# Patient Record
Sex: MALE | Race: BLACK OR AFRICAN AMERICAN | NOT HISPANIC OR LATINO | URBAN - METROPOLITAN AREA
[De-identification: names, ages, dates, MRNs, and addresses within clinical notes are randomized per-mention and may not be internally consistent; named-entity substitution may affect disease eponyms.]

---

## 2017-05-01 ENCOUNTER — OUTPATIENT (OUTPATIENT)
Dept: OUTPATIENT SERVICES | Facility: HOSPITAL | Age: 77
LOS: 1 days | End: 2017-05-01
Payer: MEDICARE

## 2017-05-01 VITALS
WEIGHT: 184.97 LBS | TEMPERATURE: 98 F | RESPIRATION RATE: 16 BRPM | DIASTOLIC BLOOD PRESSURE: 56 MMHG | SYSTOLIC BLOOD PRESSURE: 116 MMHG | HEART RATE: 73 BPM | HEIGHT: 70 IN

## 2017-05-01 DIAGNOSIS — N40.1 BENIGN PROSTATIC HYPERPLASIA WITH LOWER URINARY TRACT SYMPTOMS: ICD-10-CM

## 2017-05-01 DIAGNOSIS — Z98.41 CATARACT EXTRACTION STATUS, RIGHT EYE: Chronic | ICD-10-CM

## 2017-05-01 DIAGNOSIS — N40.0 BENIGN PROSTATIC HYPERPLASIA WITHOUT LOWER URINARY TRACT SYMPTOMS: ICD-10-CM

## 2017-05-01 DIAGNOSIS — Z01.818 ENCOUNTER FOR OTHER PREPROCEDURAL EXAMINATION: ICD-10-CM

## 2017-05-01 DIAGNOSIS — R31.29 OTHER MICROSCOPIC HEMATURIA: ICD-10-CM

## 2017-05-01 PROCEDURE — 80048 BASIC METABOLIC PNL TOTAL CA: CPT

## 2017-05-01 PROCEDURE — 87086 URINE CULTURE/COLONY COUNT: CPT

## 2017-05-01 PROCEDURE — G0463: CPT

## 2017-05-01 PROCEDURE — 85027 COMPLETE CBC AUTOMATED: CPT

## 2017-05-01 PROCEDURE — 81001 URINALYSIS AUTO W/SCOPE: CPT

## 2017-05-01 PROCEDURE — 93005 ELECTROCARDIOGRAM TRACING: CPT

## 2017-05-01 PROCEDURE — 93010 ELECTROCARDIOGRAM REPORT: CPT | Mod: NC

## 2017-05-01 NOTE — H&P PST ADULT - ASSESSMENT
77 y/o male with BPH with c/o hematuria, scheduled for cystoscopy and urethroscopy. pre op testing today. medical eval advised.

## 2017-05-01 NOTE — H&P PST ADULT - FAMILY HISTORY
Mother  Still living? No  FH: stroke, Age at diagnosis: Age Unknown     Father  Still living? No  FH: colon cancer, Age at diagnosis: Age Unknown

## 2017-05-01 NOTE — H&P PST ADULT - HISTORY OF PRESENT ILLNESS
75 y/o male 75 y/o male, PMH of HTN, Gout, hyperlipidemia, went for regular check up, had blood in the urine when tested. Also with h/o enlarged prostate, gets up 2-3 times at night to urinate.  Was referred to Dr Casanova, scheduled fro cystoscopy and urethroscopy. preop testing today.

## 2017-05-01 NOTE — H&P PST ADULT - NSANTHOSAYNRD_GEN_A_CORE
No. ENRRIQUE screening performed.  STOP BANG Legend: 0-2 = LOW Risk; 3-4 = INTERMEDIATE Risk; 5-8 = HIGH Risk

## 2017-05-02 LAB
CULTURE RESULTS: NO GROWTH — SIGNIFICANT CHANGE UP
SPECIMEN SOURCE: SIGNIFICANT CHANGE UP

## 2017-05-10 ENCOUNTER — TRANSCRIPTION ENCOUNTER (OUTPATIENT)
Age: 77
End: 2017-05-10

## 2017-05-10 RX ORDER — SODIUM CHLORIDE 9 MG/ML
1000 INJECTION, SOLUTION INTRAVENOUS
Qty: 0 | Refills: 0 | Status: DISCONTINUED | OUTPATIENT
Start: 2017-05-11 | End: 2017-05-11

## 2017-05-11 ENCOUNTER — OUTPATIENT (OUTPATIENT)
Dept: OUTPATIENT SERVICES | Facility: HOSPITAL | Age: 77
LOS: 1 days | Discharge: HOME | End: 2017-05-11
Payer: MEDICARE

## 2017-05-11 VITALS
DIASTOLIC BLOOD PRESSURE: 70 MMHG | RESPIRATION RATE: 12 BRPM | TEMPERATURE: 98 F | OXYGEN SATURATION: 97 % | HEART RATE: 56 BPM | SYSTOLIC BLOOD PRESSURE: 130 MMHG

## 2017-05-11 VITALS
SYSTOLIC BLOOD PRESSURE: 137 MMHG | HEIGHT: 70 IN | RESPIRATION RATE: 16 BRPM | TEMPERATURE: 98 F | WEIGHT: 186.95 LBS | OXYGEN SATURATION: 96 % | HEART RATE: 59 BPM | DIASTOLIC BLOOD PRESSURE: 68 MMHG

## 2017-05-11 DIAGNOSIS — Z98.41 CATARACT EXTRACTION STATUS, RIGHT EYE: Chronic | ICD-10-CM

## 2017-05-11 DIAGNOSIS — R31.29 OTHER MICROSCOPIC HEMATURIA: ICD-10-CM

## 2017-05-11 DIAGNOSIS — N40.1 BENIGN PROSTATIC HYPERPLASIA WITH LOWER URINARY TRACT SYMPTOMS: ICD-10-CM

## 2017-05-11 PROCEDURE — 87086 URINE CULTURE/COLONY COUNT: CPT

## 2017-05-11 PROCEDURE — 52000 CYSTOURETHROSCOPY: CPT

## 2017-05-11 RX ORDER — AMLODIPINE BESYLATE 2.5 MG/1
5 TABLET ORAL DAILY
Qty: 0 | Refills: 0 | Status: DISCONTINUED | OUTPATIENT
Start: 2017-05-11 | End: 2017-05-26

## 2017-05-11 RX ORDER — ALLOPURINOL 300 MG
300 TABLET ORAL DAILY
Qty: 0 | Refills: 0 | Status: DISCONTINUED | OUTPATIENT
Start: 2017-05-11 | End: 2017-05-26

## 2017-05-11 RX ORDER — CEFTRIAXONE 500 MG/1
1 INJECTION, POWDER, FOR SOLUTION INTRAMUSCULAR; INTRAVENOUS ONCE
Qty: 0 | Refills: 0 | Status: COMPLETED | OUTPATIENT
Start: 2017-05-11 | End: 2017-05-11

## 2017-05-11 RX ORDER — ATORVASTATIN CALCIUM 80 MG/1
1 TABLET, FILM COATED ORAL
Qty: 0 | Refills: 0 | COMMUNITY

## 2017-05-11 RX ORDER — CEFTRIAXONE 500 MG/1
1 INJECTION, POWDER, FOR SOLUTION INTRAMUSCULAR; INTRAVENOUS EVERY 24 HOURS
Qty: 0 | Refills: 0 | Status: CANCELLED | OUTPATIENT
Start: 2017-05-12 | End: 2017-05-11

## 2017-05-11 RX ORDER — ALLOPURINOL 300 MG
1 TABLET ORAL
Qty: 0 | Refills: 0 | COMMUNITY

## 2017-05-11 RX ORDER — FINASTERIDE 5 MG/1
1 TABLET, FILM COATED ORAL
Qty: 0 | Refills: 0 | COMMUNITY

## 2017-05-11 RX ORDER — AMLODIPINE BESYLATE 2.5 MG/1
1 TABLET ORAL
Qty: 0 | Refills: 0 | COMMUNITY

## 2017-05-11 RX ORDER — ATORVASTATIN CALCIUM 80 MG/1
10 TABLET, FILM COATED ORAL DAILY
Qty: 0 | Refills: 0 | Status: DISCONTINUED | OUTPATIENT
Start: 2017-05-11 | End: 2017-05-11

## 2017-05-11 RX ORDER — MORPHINE SULFATE 50 MG/1
2 CAPSULE, EXTENDED RELEASE ORAL
Qty: 0 | Refills: 0 | Status: DISCONTINUED | OUTPATIENT
Start: 2017-05-11 | End: 2017-05-11

## 2017-05-11 RX ORDER — CEFTRIAXONE 500 MG/1
INJECTION, POWDER, FOR SOLUTION INTRAMUSCULAR; INTRAVENOUS
Qty: 0 | Refills: 0 | Status: DISCONTINUED | OUTPATIENT
Start: 2017-05-11 | End: 2017-05-11

## 2017-05-11 RX ORDER — SODIUM CHLORIDE 9 MG/ML
1000 INJECTION, SOLUTION INTRAVENOUS
Qty: 0 | Refills: 0 | Status: DISCONTINUED | OUTPATIENT
Start: 2017-05-11 | End: 2017-05-11

## 2017-05-11 RX ORDER — FINASTERIDE 5 MG/1
5 TABLET, FILM COATED ORAL DAILY
Qty: 0 | Refills: 0 | Status: DISCONTINUED | OUTPATIENT
Start: 2017-05-11 | End: 2017-05-26

## 2017-05-11 RX ORDER — ONDANSETRON 8 MG/1
4 TABLET, FILM COATED ORAL ONCE
Qty: 0 | Refills: 0 | Status: DISCONTINUED | OUTPATIENT
Start: 2017-05-11 | End: 2017-05-11

## 2017-05-11 RX ADMIN — SODIUM CHLORIDE 60 MILLILITER(S): 9 INJECTION, SOLUTION INTRAVENOUS at 13:22

## 2017-05-11 NOTE — BRIEF OPERATIVE NOTE - POST-OP DX
BPH (benign prostatic hypertrophy) with urinary obstruction  05/11/2017    Active  Gurpreet Casanova

## 2017-05-11 NOTE — ASU DISCHARGE PLAN (ADULT/PEDIATRIC). - MEDICATION SUMMARY - MEDICATIONS TO TAKE
I will START or STAY ON the medications listed below when I get home from the hospital:    finasteride 1 mg oral tablet  -- 1 tab(s) by mouth once a day  -- Indication: For fro prostate    allopurinol 300 mg oral tablet  -- 1 tab(s) by mouth once a day  -- Indication: For per PMD    atorvastatin 10 mg oral tablet  -- 1 tab(s) by mouth once a day  -- Indication: For per PMD    amLODIPine 5 mg oral tablet  -- 1 tab(s) by mouth once a day  -- Indication: For per PMD

## 2017-05-11 NOTE — BRIEF OPERATIVE NOTE - OPERATION/FINDINGS
6-7 cm prosate with co=aptation lateral lobes, enlarged median lobe with intravesicle prostae and 2-3+ trabeculation

## 2017-05-12 LAB
CULTURE RESULTS: NO GROWTH — SIGNIFICANT CHANGE UP
SPECIMEN SOURCE: SIGNIFICANT CHANGE UP

## 2017-05-19 DIAGNOSIS — I10 ESSENTIAL (PRIMARY) HYPERTENSION: ICD-10-CM

## 2017-05-19 DIAGNOSIS — E78.5 HYPERLIPIDEMIA, UNSPECIFIED: ICD-10-CM

## 2017-05-19 DIAGNOSIS — N13.8 OTHER OBSTRUCTIVE AND REFLUX UROPATHY: ICD-10-CM

## 2017-05-19 DIAGNOSIS — N40.1 BENIGN PROSTATIC HYPERPLASIA WITH LOWER URINARY TRACT SYMPTOMS: ICD-10-CM

## 2018-06-19 ENCOUNTER — TRANSCRIPTION ENCOUNTER (OUTPATIENT)
Age: 78
End: 2018-06-19

## 2018-06-20 ENCOUNTER — OUTPATIENT (OUTPATIENT)
Dept: OUTPATIENT SERVICES | Facility: HOSPITAL | Age: 78
LOS: 1 days | End: 2018-06-20
Payer: MEDICARE

## 2018-06-20 DIAGNOSIS — Z98.41 CATARACT EXTRACTION STATUS, RIGHT EYE: Chronic | ICD-10-CM

## 2018-06-20 DIAGNOSIS — Z86.010 PERSONAL HISTORY OF COLONIC POLYPS: ICD-10-CM

## 2018-06-20 PROCEDURE — 88305 TISSUE EXAM BY PATHOLOGIST: CPT | Mod: 26

## 2018-06-20 PROCEDURE — 45380 COLONOSCOPY AND BIOPSY: CPT | Mod: PT

## 2018-06-20 PROCEDURE — 88305 TISSUE EXAM BY PATHOLOGIST: CPT

## 2018-06-21 LAB — SURGICAL PATHOLOGY FINAL REPORT - CH: SIGNIFICANT CHANGE UP

## 2022-09-16 ENCOUNTER — EMERGENCY (EMERGENCY)
Facility: HOSPITAL | Age: 82
LOS: 1 days | Discharge: HOME | End: 2022-09-16
Attending: STUDENT IN AN ORGANIZED HEALTH CARE EDUCATION/TRAINING PROGRAM | Admitting: STUDENT IN AN ORGANIZED HEALTH CARE EDUCATION/TRAINING PROGRAM
Payer: MEDICARE

## 2022-09-16 VITALS
TEMPERATURE: 97 F | RESPIRATION RATE: 16 BRPM | WEIGHT: 169.98 LBS | DIASTOLIC BLOOD PRESSURE: 86 MMHG | HEIGHT: 70 IN | SYSTOLIC BLOOD PRESSURE: 161 MMHG | OXYGEN SATURATION: 97 % | HEART RATE: 87 BPM

## 2022-09-16 VITALS
HEART RATE: 82 BPM | TEMPERATURE: 97 F | SYSTOLIC BLOOD PRESSURE: 144 MMHG | OXYGEN SATURATION: 98 % | RESPIRATION RATE: 16 BRPM | DIASTOLIC BLOOD PRESSURE: 78 MMHG

## 2022-09-16 DIAGNOSIS — Z98.41 CATARACT EXTRACTION STATUS, RIGHT EYE: Chronic | ICD-10-CM

## 2022-09-16 PROBLEM — E78.5 HYPERLIPIDEMIA, UNSPECIFIED: Chronic | Status: ACTIVE | Noted: 2017-05-01

## 2022-09-16 PROBLEM — N40.0 BENIGN PROSTATIC HYPERPLASIA WITHOUT LOWER URINARY TRACT SYMPTOMS: Chronic | Status: ACTIVE | Noted: 2017-05-01

## 2022-09-16 PROBLEM — I10 ESSENTIAL (PRIMARY) HYPERTENSION: Chronic | Status: ACTIVE | Noted: 2017-05-01

## 2022-09-16 PROBLEM — M10.9 GOUT, UNSPECIFIED: Chronic | Status: ACTIVE | Noted: 2017-05-01

## 2022-09-16 PROCEDURE — 99282 EMERGENCY DEPT VISIT SF MDM: CPT

## 2022-09-16 PROCEDURE — 99284 EMERGENCY DEPT VISIT MOD MDM: CPT

## 2022-09-16 NOTE — ED PROVIDER NOTE - NSFOLLOWUPINSTRUCTIONS_ED_ALL_ED_FT
Follow up [facility] Dr Casanova  Return to ED for worsening symptoms, fevers, chills, nausea, vomiting, back pain or other concerns         URINARY RETENTION IN MEN - AfterCare(R) Instructions(ER/ED)           Urinary Retention in Men    WHAT YOU NEED TO KNOW:    Urinary retention is a condition that develops when your bladder does not empty completely when you urinate.   Male Reproductive System         DISCHARGE INSTRUCTIONS:    Medicines:   •Medicines can help decrease the size of your prostate, fight infection, and help you urinate more easily.      •Take your medicine as directed. Contact your healthcare provider if you think your medicine is not helping or if you have side effects. Tell your provider if you are allergic to any medicine. Keep a list of the medicines, vitamins, and herbs you take. Include the amounts, and when and why you take them. Bring the list or the pill bottles to follow-up visits. Carry your medicine list with you in case of an emergency.      Alberto catheter care: You may need a Alberto catheter for up to 2 weeks at home. Healthcare providers will give you a smaller leg bag to collect urine. Keep the bag below your waist. This will prevent urine from flowing back into your bladder and causing an infection or other problems. Also, keep the tube free of kinks so the urine will drain properly. Do not pull on the catheter. This can cause pain and bleeding, and may cause the catheter to come out. Ask your healthcare provider or urologist for more information on Alberto catheter care.    Urinate regularly: When your catheter is removed, do not let your bladder become too full before you urinate. Set regular times each day to urinate. Urinate as soon as you feel the need or at least every 3 hours while you are awake. Do not drink liquids before you go to bed. Urinate right before you go to bed.    Follow up with your healthcare provider or urologist as directed: Write down your questions so you remember to ask them during your visits.     Contact your healthcare provider or urologist if:   •You have a fever.      •You have pain when you urinate.      •You have blood in your urine.      •You have problems with your catheter.      •You have questions or concerns about your condition or care.      Return to the emergency department if:   •You have severe abdominal pain.      •You are breathing faster than usual.      •Your heartbeat is faster than usual.      •Your face, hands, feet, or ankles are swollen.

## 2022-09-16 NOTE — ED ADULT NURSE NOTE - CAS TRG GEN SKIN COLOR
Patient Name:  Romina Morse  MRN:  [id number]    Assessment & Plan     1  Complete tear of right rotator cuff, unspecified whether traumatic  -     Case request operating room: ARTHROSCOPY SHOULDER- Right shoulder arthroscopic rotator cuff repair,  all associated procedures; Standing  -     Ambulatory referral to [facility]; Future  -     Comprehensive metabolic panel; Future  -     CBC and differential; Future  -     EKG 12 lead; Future  -     XR chest pa & lateral; Future; Expected date: 09/01/2022  -     Case request operating room: ARTHROSCOPY SHOULDER- Right shoulder arthroscopic rotator cuff repair,  all associated procedures  -     Arc 2 0    2  Complete tear of left rotator cuff, unspecified whether traumatic  -     Large joint arthrocentesis: L subacromial bursa    3  Right shoulder pain, unspecified chronicity        63 y o  male with Bilateral shoulder rotator cuff tears, Right more painful than Left  I reviewed and discussed the patient's bilateral shoulder MRI displaying rotator cuff tears, Left side with increased retraction and fatty atrophy indicative chronicity; Right shoulder with mild atrophy and minimal retraction  We discussed the patient's diagnosis and associated treatment options including continued nonoperative and surgical treatment  Nonoperative management would include formal physical therapy and a physician directed home exercise program, activity modification, injection therapy, and oral analgesics  Risks of conservative management include:  Progression of tear size, persistent pain, increased weakness, increased tendon retraction, increased fatty infiltration, decreased potential for repair and healing in the future due to tear progression/retraction/fatty infiltration/increasing age, and future development of rotator cuff arthropathy  Surgical intervention was also discussed in the form of Right shoulder arthroscopy with rotator cuff repair, all associated procedures  Risks of surgery, including but not limited to, anesthesia complications, infection, damage to nerves and blood vessels, blood clots, postoperative stiffness, residual pain, inability to repair the torn tendon, failure of tendon healing, residual weakness, need for subsequent surgery, were discussed at length  The patient's MRI does display a rotator cuff tear which corresponds to the patient's symptoms and physical exam findings  The patient has failed non-operative management including injection therapy, outpatient PT, OTC oral analgesics as needed  The patient understands the risks and benefits of all mentioned treatment options and has no further questions  The patient has elected to proceed forward with Right shoulder arthroscopy with rotator cuff repair, all associated procedures  The patient received a postoperative sling in the office today and was instructed to bring it on the day of surgery  I will see him on the day of surgery on 09/16/2022  As patient is also having pain in the Left shoulder, offered corticosteroid injection today to decrease pain and wished to proceed  Risks and benefits of corticosteroid injection were discussed in detail  Risks including:  Post injection pain, elevation in blood sugar, skin discoloration, fatty atrophy, and infection were discussed in detail  The patient understood and elected to proceed forward  After sterile preparation the Left subacromial space was injected with 2 cc of 1% lidocaine, 2 cc of 0 25% bupivacaine, and 2 cc of Depo-Medrol  The patient tolerated the procedure and no immediate complications were noted  The patient was instructed to ice and elevate the injection site, limit strenuous activity for the next 24-48 hours, and contact us if there were any questions or concerns prior to their follow-up appointment    They were also instructed to monitor blood glucose, checking about 3 times daily for the next week, and contact their primary care physician for management of medications if indicated  History of the Present Illness   Manda Myers is a 63 y o  male with Bilateral shoulder internal derangement  Today, patient reports continued bilateral shoulder pain, Right > Left  He admits to difficulty with ADLs and range of motion; admits to nighttime awakenings  He has tried to administer NSAIDs with increased stomach discomfort  Review of Systems     Review of Systems   Constitutional: Negative for chills and fever  HENT: Negative for ear pain and sore throat  Eyes: Negative for pain and visual disturbance  Respiratory: Negative for cough and shortness of breath  Cardiovascular: Negative for chest pain and palpitations  Gastrointestinal: Negative for abdominal pain and vomiting  Genitourinary: Negative for dysuria and hematuria  Musculoskeletal: Negative for arthralgias and back pain  Skin: Negative for color change and rash  Neurological: Negative for seizures and syncope  All other systems reviewed and are negative  Physical Exam     /91   Pulse 85   Ht 5' 11" (1 803 m)   Wt 118 kg (260 lb 12 8 oz)   BMI 36 37 kg/m²     Right Shoulder: Active range of motion   170 degrees forward flexion  160 degrees abduction  60 degrees external rotation   Mid thoracic internal rotation    There is tenderness present over the proximal biceps tendon  There is 4-/5 strength with external rotation testing at the side  Empty can testing is positive with increased weakness compared to contralateral side  Belly press test is negative  Sosa test is positive  [place]'s test is negative    Speed's test is Negative  The patient is neurovascularly intact distally in the extremity  Left Shoulder: Active range of motion   170 degrees forward flexion  160 degrees abduction  50 degrees external rotation   2 level restriction internal rotation    There is tenderness present over the proximal biceps tendon  There is 4-/5 strength with external rotation testing at the side  Empty can testing is positive   Belly press test is negative  Sosa test is negative   [place]'s test is negative    Speed's test is Negative  The patient is neurovascularly intact distally in the extremity  Data Review     I have personally reviewed pertinent films in PACS, and my interpretation follows  X-rays taken previously of the Left shoulder demonstrates no acute fracture or other osseous abnormalities      X-rays taken previously of the Right shoulder demonstrates no acute fracture or other osseous abnormalities  MRI performed of Right shoulder demonstrates complete full thickness tear of supraspinatus with mild retraction to humeral head, minimal to mild atrophy noted  No actue fracture  MRI of Left shoulder demonstrates full thickness tear of supraspinatus and infraspinatus with retraction to glenoid  Moderate atrophy of supraspinatus and infraspinatus tendons       Social History     Tobacco Use    Smoking status: Former Smoker     Types: Cigarettes     Start date: 1981    Smokeless tobacco: Never Used   Vaping Use    Vaping Use: Former   Substance Use Topics    Alcohol use: Not Currently     Comment: Sober 5 moths    Drug use: Yes     Types: Marijuana     Comment: Medical Card           Large joint arthrocentesis: L subacromial bursa  Universal Protocol:  Consent given by: patient  Patient identity confirmed: verbally with patient    Supporting Documentation  Indications: pain   Procedure Details  Location: shoulder - L subacromial bursa  Needle size: 22 G  Approach: lateral  Medications administered: 2 mL bupivacaine 0 25 %; 2 mL lidocaine 1 %; 2 mL methylPREDNISolone acetate 40 mg/mL    Patient tolerance: patient tolerated the procedure well with no immediate complications  Dressing:  Sterile dressing applied            Jose Greenfield DO Normal for race

## 2022-09-16 NOTE — ED PROVIDER NOTE - NSICDXPASTMEDICALHX_GEN_ALL_CORE_FT
PAST MEDICAL HISTORY:  BPH (benign prostatic hyperplasia)     Gout     Hyperlipidemia     Hypertension

## 2022-09-16 NOTE — ED PROVIDER NOTE - NSICDXFAMILYHX_GEN_ALL_CORE_FT
FAMILY HISTORY:  Father  Still living? No  FH: colon cancer, Age at diagnosis: Age Unknown    Mother  Still living? No  FH: stroke, Age at diagnosis: Age Unknown

## 2022-09-16 NOTE — ED ADULT TRIAGE NOTE - HISTORY OF COVID-19 VACCINATION
H&P Update: 
Erin Owens was seen and examined. History and physical has been reviewed. The patient has been examined.  There have been no significant clinical changes since the completion of the originally dated History and Physical. 
 
 
 Yes

## 2022-09-16 NOTE — ED PROVIDER NOTE - PATIENT PORTAL LINK FT
You can access the FollowMyHealth Patient Portal offered by [facility] by registering at the following website: [email or web address]. By joining [employer]’s FollowMyHealth portal, you will also be able to view your health information using other applications (apps) compatible with our system.

## 2022-09-16 NOTE — ED PROVIDER NOTE - CARE PROVIDER_API CALL
Gurpreet Casanova (MD)  Urology  [address], [address]  [place] [address]  Phone: [phone]  Fax: [phone]  Follow Up Time: 4-6 Days

## 2022-09-16 NOTE — ED PROVIDER NOTE - OBJECTIVE STATEMENT
81-year-old male history of BPH hypertension hyperlipidemia presents with urinary retention since yesterday, about 12 hours ago patient had Hopkins placed September 7 for urinary retention after prostate biopsy head Hopkins removed yesterday by Dr. cornelius And several hours later developed urinary retention again no fevers no chills no nausea no vomiting or back pain no other complaints at present other than inability to urinate and.  will place hopkins And DC with urology follow-up

## 2022-09-20 NOTE — ED ADULT NURSE NOTE - SUICIDE SCREENING QUESTION 1
Requested medication(s) are due for refill today: Yes  Patient has already received a courtesy refill: No  Other reason request has been forwarded to provider: No

## 2022-09-23 ENCOUNTER — EMERGENCY (EMERGENCY)
Facility: HOSPITAL | Age: 82
LOS: 1 days | Discharge: HOME | End: 2022-09-23
Attending: EMERGENCY MEDICINE | Admitting: EMERGENCY MEDICINE
Payer: MEDICARE

## 2022-09-23 VITALS
TEMPERATURE: 97 F | HEART RATE: 88 BPM | DIASTOLIC BLOOD PRESSURE: 84 MMHG | OXYGEN SATURATION: 97 % | SYSTOLIC BLOOD PRESSURE: 158 MMHG | HEIGHT: 70 IN | RESPIRATION RATE: 16 BRPM | WEIGHT: 169.98 LBS

## 2022-09-23 VITALS
TEMPERATURE: 98 F | RESPIRATION RATE: 18 BRPM | OXYGEN SATURATION: 97 % | SYSTOLIC BLOOD PRESSURE: 124 MMHG | DIASTOLIC BLOOD PRESSURE: 57 MMHG | HEART RATE: 61 BPM

## 2022-09-23 DIAGNOSIS — Z98.41 CATARACT EXTRACTION STATUS, RIGHT EYE: Chronic | ICD-10-CM

## 2022-09-23 LAB
APPEARANCE UR: CLEAR — SIGNIFICANT CHANGE UP
BILIRUB UR-MCNC: NEGATIVE — SIGNIFICANT CHANGE UP
COLOR SPEC: YELLOW — SIGNIFICANT CHANGE UP
DIFF PNL FLD: ABNORMAL
GLUCOSE UR QL: NEGATIVE — SIGNIFICANT CHANGE UP
KETONES UR-MCNC: NEGATIVE — SIGNIFICANT CHANGE UP
LEUKOCYTE ESTERASE UR-ACNC: NEGATIVE — SIGNIFICANT CHANGE UP
NITRITE UR-MCNC: NEGATIVE — SIGNIFICANT CHANGE UP
PH UR: 6.5 — SIGNIFICANT CHANGE UP (ref 5–8)
PROT UR-MCNC: 30 MG/DL
SP GR SPEC: 1.01 — SIGNIFICANT CHANGE UP (ref 1.01–1.02)
UROBILINOGEN FLD QL: NEGATIVE — SIGNIFICANT CHANGE UP

## 2022-09-23 PROCEDURE — 81001 URINALYSIS AUTO W/SCOPE: CPT

## 2022-09-23 PROCEDURE — 99283 EMERGENCY DEPT VISIT LOW MDM: CPT | Mod: 25

## 2022-09-23 PROCEDURE — 51702 INSERT TEMP BLADDER CATH: CPT

## 2022-09-23 PROCEDURE — 99283 EMERGENCY DEPT VISIT LOW MDM: CPT | Mod: FS

## 2022-09-23 PROCEDURE — 87086 URINE CULTURE/COLONY COUNT: CPT

## 2022-09-23 NOTE — ED PROVIDER NOTE - PATIENT PORTAL LINK FT
You can access the FollowMyHealth Patient Portal offered by [facility] by registering at the following website: [email or web address]. By joining [employer]’s FollowMyHealth portal, you will also be able to view your health information using other applications (apps) compatible with our system.

## 2022-09-23 NOTE — ED PROVIDER NOTE - PROGRESS NOTE DETAILS
Spoke to urologist Dr. Casanova, advised to send urinalysis and treat if necessary.  Advised to call office on Monday for follow-up next week and will be in touch with patient over the weekend as per urologist. Spoke to urologist Dr. Casanova, advised to send urinalysis.  Advised to call office on Monday for follow-up next week and will be in touch with patient over the weekend as per urologist. Reevaluated patient at bedside.  Patient feeling much improved after hopkins placed.  Discussed the results of all diagnostic testing in ED and copies of all reports given.   An opportunity to ask questions was given.  Discussed the importance of prompt, close medical follow-up.  Patient will return with any changes, concerns or persistent / worsening symptoms.  Understanding of all instructions verbalized.

## 2022-09-23 NOTE — ED PROVIDER NOTE - NS ED ATTENDING STATEMENT MOD
This was a shared visit with the WILEY. I reviewed and verified the documentation and independently performed the documented:

## 2022-09-23 NOTE — ED ADULT NURSE NOTE - OBJECTIVE STATEMENT
Patient is 82yo M presents with c/o unable to urinate since yesterday, reports lower abdomen discomfort. Patient reports he was here last week with the same issue, reports a urinary catheter was placed then removed a few days later and now he cannot urinate again.

## 2022-09-23 NOTE — ED PROVIDER NOTE - CARE PROVIDER_API CALL
Gurpreet Casanova)  Urology  [address], [address]  [place] [address]  Phone: [phone]  Fax: [phone]  Follow Up Time:

## 2022-09-23 NOTE — ED ADULT NURSE NOTE - NSIMPLEMENTINTERV_GEN_ALL_ED
Implemented All Universal Safety Interventions:  [place] to call system. Call bell, personal items and telephone within reach. Instruct patient to call for assistance. Room bathroom lighting operational. Non-slip footwear when patient is off stretcher. Physically safe environment: no spills, clutter or unnecessary equipment. Stretcher in lowest position, wheels locked, appropriate side rails in place.

## 2022-09-23 NOTE — ED ADULT NURSE REASSESSMENT NOTE - NS ED NURSE REASSESS COMMENT FT1
Bladder scan performed, patient with >1000cc urine in bladder. MD Lopes made aware. Alberto catheter placed per MD Lopes orders.

## 2022-09-23 NOTE — ED PROVIDER NOTE - NSFOLLOWUPINSTRUCTIONS_ED_ALL_ED_FT
Urinary Retention in Men    WHAT YOU NEED TO KNOW:    Urinary retention is a condition that develops when your bladder does not empty completely when you urinate.   Male Reproductive System         DISCHARGE INSTRUCTIONS:    Medicines:   •Medicines can help decrease the size of your prostate, fight infection, and help you urinate more easily.      •Take your medicine as directed. Contact your healthcare provider if you think your medicine is not helping or if you have side effects. Tell your provider if you are allergic to any medicine. Keep a list of the medicines, vitamins, and herbs you take. Include the amounts, and when and why you take them. Bring the list or the pill bottles to follow-up visits. Carry your medicine list with you in case of an emergency.      Alberto catheter care: You may need a Alberto catheter for up to 2 weeks at home. Healthcare providers will give you a smaller leg bag to collect urine. Keep the bag below your waist. This will prevent urine from flowing back into your bladder and causing an infection or other problems. Also, keep the tube free of kinks so the urine will drain properly. Do not pull on the catheter. This can cause pain and bleeding, and may cause the catheter to come out. Ask your healthcare provider or urologist for more information on Alberto catheter care.    Urinate regularly: When your catheter is removed, do not let your bladder become too full before you urinate. Set regular times each day to urinate. Urinate as soon as you feel the need or at least every 3 hours while you are awake. Do not drink liquids before you go to bed. Urinate right before you go to bed.    Follow up with your healthcare provider or urologist as directed: Write down your questions so you remember to ask them during your visits.     Contact your healthcare provider or urologist if:   •You have a fever.      •You have pain when you urinate.      •You have blood in your urine.      •You have problems with your catheter.      •You have questions or concerns about your condition or care.      Return to the emergency department if:   •You have severe abdominal pain.      •You are breathing faster than usual.      •Your heartbeat is faster than usual.      •Your face, hands, feet, or ankles are swollen. Follow-up with urologist for reevaluation, ongoing care and treatment.  Rest, drink plenty of fluids.  If having worsening of symptoms, fever, vomiting, blood in urine or other related symptoms, return to the ER immediately.    Urinary Retention in Men    WHAT YOU NEED TO KNOW:    Urinary retention is a condition that develops when your bladder does not empty completely when you urinate.   Male Reproductive System         DISCHARGE INSTRUCTIONS:    Medicines:   •Medicines can help decrease the size of your prostate, fight infection, and help you urinate more easily.      •Take your medicine as directed. Contact your healthcare provider if you think your medicine is not helping or if you have side effects. Tell your provider if you are allergic to any medicine. Keep a list of the medicines, vitamins, and herbs you take. Include the amounts, and when and why you take them. Bring the list or the pill bottles to follow-up visits. Carry your medicine list with you in case of an emergency.      Alberto catheter care: You may need a Alberto catheter for up to 2 weeks at home. Healthcare providers will give you a smaller leg bag to collect urine. Keep the bag below your waist. This will prevent urine from flowing back into your bladder and causing an infection or other problems. Also, keep the tube free of kinks so the urine will drain properly. Do not pull on the catheter. This can cause pain and bleeding, and may cause the catheter to come out. Ask your healthcare provider or urologist for more information on Alberto catheter care.    Urinate regularly: When your catheter is removed, do not let your bladder become too full before you urinate. Set regular times each day to urinate. Urinate as soon as you feel the need or at least every 3 hours while you are awake. Do not drink liquids before you go to bed. Urinate right before you go to bed.    Follow up with your healthcare provider or urologist as directed: Write down your questions so you remember to ask them during your visits.     Contact your healthcare provider or urologist if:   •You have a fever.      •You have pain when you urinate.      •You have blood in your urine.      •You have problems with your catheter.      •You have questions or concerns about your condition or care.      Return to the emergency department if:   •You have severe abdominal pain.      •You are breathing faster than usual.      •Your heartbeat is faster than usual.      •Your face, hands, feet, or ankles are swollen.

## 2022-09-23 NOTE — ED PROVIDER NOTE - ATTENDING APP SHARED VISIT CONTRIBUTION OF CARE
81-year-old male history of BPH, hypertension, hyperlipidemia,-year-old to the ER complaining of urinary retention.  Patient states that he has had recent Alberto catheter and had his catheter recently removed at urology office on Wednesday and states that since last night he has not been able to urinate and is complaining of bladder distention and suprapubic pressure.  Patient alert and oriented, noted distention of abdomen, to get bladder scan and Alberto catheter insertion into full patient's urologist.

## 2022-09-23 NOTE — ED PROVIDER NOTE - ABDOMINAL EXAM
+suprapubic tenderness and distention noted/soft/no organomegaly +suprapubic tenderness and distention noted, otherwise abdomen non tender, no CVAT B/soft/no organomegaly

## 2022-09-23 NOTE — ED PROVIDER NOTE - OBJECTIVE STATEMENT
80 y/o M with hx of BPH, hypertension, hyperlipidemia, gout presents with c/o urinary retention since last night.  Patient states that he recently had hopkins catheter placed in ED on 9/16/2022 for urinary retention and had the catheter removed by urologist, Dr. Casanova in his office on 9/21. States that he has not been able to urinate since 10pm last night and is complaining of bladder distention and suprapubic pressure. Denies dysuria, hematuria, fever, chills, N/V, flank pain.

## 2022-09-24 LAB
CULTURE RESULTS: NO GROWTH — SIGNIFICANT CHANGE UP
SPECIMEN SOURCE: SIGNIFICANT CHANGE UP

## 2022-12-28 ENCOUNTER — EMERGENCY (EMERGENCY)
Facility: HOSPITAL | Age: 82
LOS: 1 days | Discharge: HOME | End: 2022-12-28
Attending: EMERGENCY MEDICINE | Admitting: EMERGENCY MEDICINE
Payer: MEDICARE

## 2022-12-28 VITALS
TEMPERATURE: 98 F | SYSTOLIC BLOOD PRESSURE: 125 MMHG | HEART RATE: 89 BPM | DIASTOLIC BLOOD PRESSURE: 83 MMHG | RESPIRATION RATE: 16 BRPM | OXYGEN SATURATION: 96 %

## 2022-12-28 VITALS
RESPIRATION RATE: 16 BRPM | DIASTOLIC BLOOD PRESSURE: 105 MMHG | OXYGEN SATURATION: 93 % | TEMPERATURE: 97 F | HEART RATE: 100 BPM | SYSTOLIC BLOOD PRESSURE: 170 MMHG

## 2022-12-28 DIAGNOSIS — Z98.41 CATARACT EXTRACTION STATUS, RIGHT EYE: Chronic | ICD-10-CM

## 2022-12-28 LAB
APPEARANCE UR: ABNORMAL
BILIRUB UR-MCNC: NEGATIVE — SIGNIFICANT CHANGE UP
COLOR SPEC: YELLOW — SIGNIFICANT CHANGE UP
DIFF PNL FLD: ABNORMAL
EPI CELLS # UR: SIGNIFICANT CHANGE UP
GLUCOSE UR QL: NEGATIVE — SIGNIFICANT CHANGE UP
KETONES UR-MCNC: NEGATIVE — SIGNIFICANT CHANGE UP
LEUKOCYTE ESTERASE UR-ACNC: ABNORMAL
NITRITE UR-MCNC: NEGATIVE — SIGNIFICANT CHANGE UP
PH UR: 6 — SIGNIFICANT CHANGE UP (ref 5–8)
PROT UR-MCNC: 15
RBC CASTS # UR COMP ASSIST: ABNORMAL /HPF (ref 0–4)
SP GR SPEC: 1.01 — SIGNIFICANT CHANGE UP (ref 1.01–1.02)
UROBILINOGEN FLD QL: NEGATIVE — SIGNIFICANT CHANGE UP
WBC UR QL: ABNORMAL

## 2022-12-28 PROCEDURE — 87086 URINE CULTURE/COLONY COUNT: CPT

## 2022-12-28 PROCEDURE — 51702 INSERT TEMP BLADDER CATH: CPT

## 2022-12-28 PROCEDURE — 99283 EMERGENCY DEPT VISIT LOW MDM: CPT

## 2022-12-28 PROCEDURE — 81001 URINALYSIS AUTO W/SCOPE: CPT

## 2022-12-28 RX ORDER — CEFACLOR 125 MG/5ML
1 SUSPENSION, RECONSTITUTED, ORAL (ML) ORAL
Qty: 6 | Refills: 0
Start: 2022-12-28 | End: 2022-12-30

## 2022-12-28 RX ORDER — CEFUROXIME AXETIL 250 MG
1 TABLET ORAL
Qty: 6 | Refills: 0
Start: 2022-12-28 | End: 2022-12-30

## 2022-12-28 NOTE — ED PROVIDER NOTE - NSFOLLOWUPINSTRUCTIONS_ED_ALL_ED_FT
Return for severe pain in the abdomen, fever, leaking from the Alberto catheter or no drainage from Alberto catheter or any pain.  Call Dr. Casanova in the morning to inform him that the catheter was replaced and asked him if you should take the antibiotic E prescribed you or if you should take Ceftin which we will prescribe that you can start tomorrow if you would like you to.  Follow-up with Dr. Casanova in the next several days

## 2022-12-28 NOTE — ED PROVIDER NOTE - PATIENT PORTAL LINK FT
You can access the FollowMyHealth Patient Portal offered by [facility] by registering at the following website: [email or web address]. By joining [facility]’s FollowMyHealth portal, you will also be able to view your health information using other applications (apps) compatible with our system.

## 2022-12-28 NOTE — ED ADULT NURSE NOTE - OBJECTIVE STATEMENT
Patient received awake, alert, and orientated. No distress noted. Vital signs stable.  No complaints of pain. No obvious signs or symptoms of injury. Patient has not been able to urinate since 8pm

## 2022-12-28 NOTE — ED ADULT TRIAGE NOTE - TEMPERATURE IN FAHRENHEIT (DEGREES F)
HPI     LELIA:1yr   Glasses? no  Contacts? no  H/o eye surgery, injections or laser: no  H/o eye injury: no  Known eye conditions? no  Family h/o eye conditions? no  Eye gtts?no    (no) Flashes (-) Floaters (-) Mucous   (-) Tearing (-) Itching (-) Burning   (-) Headaches (-) Eye Pain/discomfort (-) Irritation   (-) Redness (-) Double vision (-) Blurry vision    Diabetic? (-)  A1c?  (No results found for: HGBA1C)        Last edited by Leann Carlos on 9/3/2020  2:45 PM. (History)            Assessment /Plan     For exam results, see Encounter Report.    Hyperopia with astigmatism, bilateral    No srx indicated at this time. Small uncorrected hyperopia not causing eyestrain or HA. RTc 1 year Routine. Normal ocular health.                   97.2

## 2022-12-28 NOTE — ED PROVIDER NOTE - CARE PROVIDERS DIRECT ADDRESSES
[email or web address]

## 2022-12-28 NOTE — ED PROVIDER NOTE - OBJECTIVE STATEMENT
This patient is an 82-year-old male with a history of BPH and hypertension on baby aspirin daily.  Patient had a prostate biopsy multiple months ago and after the biopsy the patient wind up having urinary retention and requiring a Alberto catheter since that time the patient has had multiple attempts at catheter removal which have failed requiring emergency room visits and Alberto to be replaced each time.  Patient saw Dr. Casanova today and had his Alberto catheter removed and was given an oral antibiotic and antibiotic pill for 1 a day for the next 2 days.  Patient went home and was able to urinate several times but since wound well earlier this evening he has been unable to urinate and began to have suprapubic discomfort and felt that he was in retention again.  Patient denies hematuria, fever, chills or any other symptoms.  Patient came to the ER for treatment    dr casanova, urology

## 2022-12-28 NOTE — ED PROVIDER NOTE - CLINICAL SUMMARY MEDICAL DECISION MAKING FREE TEXT BOX
This patient is an 82-year-old male with a history of BPH and hypertension on baby aspirin daily.  Patient had a prostate biopsy multiple months ago and after the biopsy the patient wind up having urinary retention and requiring a Alberto catheter since that time the patient has had multiple attempts at catheter removal which have failed requiring emergency room visits and Alberto to be replaced each time.  Patient saw Dr. Casanova today and had his Alberto catheter removed and was given an oral antibiotic and antibiotic pill for 1 a day for the next 2 days.  Patient went home and was able to urinate several times but since wound well earlier this evening he has been unable to urinate and began to have suprapubic discomfort and felt that he was in retention again.  Patient denies hematuria, fever, chills or any other symptoms.  Patient came to the ER for treatment.  Patient on exam with distended tender bladder and hypertensive.  Alberto catheter placed with trace pink urine but mostly yellow without clots or hal blood.  UA sent and urine culture sent.  Will send a prescription for Ceftin to the pharmacy for the patient however will discharge the patient with a Alberto to leg bag and have him call Dr. Casanova in the morning to find out which antibiotics to take.

## 2022-12-28 NOTE — ED PROVIDER NOTE - CARE PROVIDER_API CALL
Gurpreet Casanova (MD)  Urology  [address], [address]  [place] [address]  Phone: [phone]  Fax: [phone]  Follow Up Time: 1-3 Days

## 2022-12-29 LAB
CULTURE RESULTS: NO GROWTH — SIGNIFICANT CHANGE UP
SPECIMEN SOURCE: SIGNIFICANT CHANGE UP

## 2023-01-25 NOTE — ASU PREOP CHECKLIST - NSWEIGHTCALCTOOLDRUG_GEN_A_CORE
[facility] used

## 2023-01-31 ENCOUNTER — EMERGENCY (EMERGENCY)
Facility: HOSPITAL | Age: 83
LOS: 1 days | Discharge: HOME | End: 2023-01-31
Attending: STUDENT IN AN ORGANIZED HEALTH CARE EDUCATION/TRAINING PROGRAM | Admitting: STUDENT IN AN ORGANIZED HEALTH CARE EDUCATION/TRAINING PROGRAM
Payer: MEDICARE

## 2023-01-31 VITALS
SYSTOLIC BLOOD PRESSURE: 147 MMHG | DIASTOLIC BLOOD PRESSURE: 86 MMHG | RESPIRATION RATE: 18 BRPM | TEMPERATURE: 97 F | HEIGHT: 71 IN | HEART RATE: 66 BPM | WEIGHT: 169.98 LBS | OXYGEN SATURATION: 98 %

## 2023-01-31 DIAGNOSIS — Z98.41 CATARACT EXTRACTION STATUS, RIGHT EYE: Chronic | ICD-10-CM

## 2023-01-31 LAB
ALBUMIN SERPL ELPH-MCNC: 3.4 G/DL — SIGNIFICANT CHANGE UP (ref 3.3–5)
ALP SERPL-CCNC: 58 U/L — SIGNIFICANT CHANGE UP (ref 40–120)
ALT FLD-CCNC: 21 U/L — SIGNIFICANT CHANGE UP (ref 12–78)
ANION GAP SERPL CALC-SCNC: 3 MMOL/L — LOW (ref 5–17)
APTT BLD: 29 SEC — SIGNIFICANT CHANGE UP (ref 27.5–35.5)
AST SERPL-CCNC: 21 U/L — SIGNIFICANT CHANGE UP (ref 15–37)
BASOPHILS # BLD AUTO: 0.04 K/UL — SIGNIFICANT CHANGE UP (ref 0–0.2)
BASOPHILS NFR BLD AUTO: 0.6 % — SIGNIFICANT CHANGE UP (ref 0–2)
BILIRUB SERPL-MCNC: 0.2 MG/DL — SIGNIFICANT CHANGE UP (ref 0.2–1.2)
BUN SERPL-MCNC: 23 MG/DL — SIGNIFICANT CHANGE UP (ref 7–23)
CALCIUM SERPL-MCNC: 9.2 MG/DL — SIGNIFICANT CHANGE UP (ref 8.5–10.1)
CHLORIDE SERPL-SCNC: 113 MMOL/L — HIGH (ref 96–108)
CO2 SERPL-SCNC: 26 MMOL/L — SIGNIFICANT CHANGE UP (ref 22–31)
CREAT SERPL-MCNC: 0.98 MG/DL — SIGNIFICANT CHANGE UP (ref 0.5–1.3)
EGFR: 78 ML/MIN/1.73M2 — SIGNIFICANT CHANGE UP
EOSINOPHIL # BLD AUTO: 0.23 K/UL — SIGNIFICANT CHANGE UP (ref 0–0.5)
EOSINOPHIL NFR BLD AUTO: 3.2 % — SIGNIFICANT CHANGE UP (ref 0–6)
GLUCOSE SERPL-MCNC: 100 MG/DL — HIGH (ref 70–99)
HCT VFR BLD CALC: 44.4 % — SIGNIFICANT CHANGE UP (ref 39–50)
HGB BLD-MCNC: 14.6 G/DL — SIGNIFICANT CHANGE UP (ref 13–17)
IMM GRANULOCYTES NFR BLD AUTO: 0.1 % — SIGNIFICANT CHANGE UP (ref 0–0.9)
INR BLD: 1.17 RATIO — HIGH (ref 0.88–1.16)
LYMPHOCYTES # BLD AUTO: 1.24 K/UL — SIGNIFICANT CHANGE UP (ref 1–3.3)
LYMPHOCYTES # BLD AUTO: 17.5 % — SIGNIFICANT CHANGE UP (ref 13–44)
MCHC RBC-ENTMCNC: 31.8 PG — SIGNIFICANT CHANGE UP (ref 27–34)
MCHC RBC-ENTMCNC: 32.9 GM/DL — SIGNIFICANT CHANGE UP (ref 32–36)
MCV RBC AUTO: 96.7 FL — SIGNIFICANT CHANGE UP (ref 80–100)
MONOCYTES # BLD AUTO: 0.65 K/UL — SIGNIFICANT CHANGE UP (ref 0–0.9)
MONOCYTES NFR BLD AUTO: 9.2 % — SIGNIFICANT CHANGE UP (ref 2–14)
NEUTROPHILS # BLD AUTO: 4.91 K/UL — SIGNIFICANT CHANGE UP (ref 1.8–7.4)
NEUTROPHILS NFR BLD AUTO: 69.4 % — SIGNIFICANT CHANGE UP (ref 43–77)
NRBC # BLD: 0 /100 WBCS — SIGNIFICANT CHANGE UP (ref 0–0)
PLATELET # BLD AUTO: 172 K/UL — SIGNIFICANT CHANGE UP (ref 150–400)
POTASSIUM SERPL-MCNC: 4.4 MMOL/L — SIGNIFICANT CHANGE UP (ref 3.5–5.3)
POTASSIUM SERPL-SCNC: 4.4 MMOL/L — SIGNIFICANT CHANGE UP (ref 3.5–5.3)
PROT SERPL-MCNC: 6.9 G/DL — SIGNIFICANT CHANGE UP (ref 6–8.3)
PROTHROM AB SERPL-ACNC: 13.7 SEC — HIGH (ref 10.5–13.4)
RBC # BLD: 4.59 M/UL — SIGNIFICANT CHANGE UP (ref 4.2–5.8)
RBC # FLD: 13.1 % — SIGNIFICANT CHANGE UP (ref 10.3–14.5)
SODIUM SERPL-SCNC: 142 MMOL/L — SIGNIFICANT CHANGE UP (ref 135–145)
WBC # BLD: 7.08 K/UL — SIGNIFICANT CHANGE UP (ref 3.8–10.5)
WBC # FLD AUTO: 7.08 K/UL — SIGNIFICANT CHANGE UP (ref 3.8–10.5)

## 2023-01-31 PROCEDURE — 36415 COLL VENOUS BLD VENIPUNCTURE: CPT

## 2023-01-31 PROCEDURE — 93971 EXTREMITY STUDY: CPT | Mod: 26,LT

## 2023-01-31 PROCEDURE — 85610 PROTHROMBIN TIME: CPT

## 2023-01-31 PROCEDURE — 99284 EMERGENCY DEPT VISIT MOD MDM: CPT | Mod: FS

## 2023-01-31 PROCEDURE — 85025 COMPLETE CBC W/AUTO DIFF WBC: CPT

## 2023-01-31 PROCEDURE — 85730 THROMBOPLASTIN TIME PARTIAL: CPT

## 2023-01-31 PROCEDURE — 99284 EMERGENCY DEPT VISIT MOD MDM: CPT | Mod: 25

## 2023-01-31 PROCEDURE — 93971 EXTREMITY STUDY: CPT

## 2023-01-31 PROCEDURE — 80053 COMPREHEN METABOLIC PANEL: CPT

## 2023-01-31 RX ORDER — APIXABAN 2.5 MG/1
10 TABLET, FILM COATED ORAL ONCE
Refills: 0 | Status: COMPLETED | OUTPATIENT
Start: 2023-01-31 | End: 2023-01-31

## 2023-01-31 RX ORDER — APIXABAN 2.5 MG/1
2 TABLET, FILM COATED ORAL
Qty: 56 | Refills: 0
Start: 2023-01-31 | End: 2023-02-06

## 2023-01-31 RX ADMIN — APIXABAN 10 MILLIGRAM(S): 2.5 TABLET, FILM COATED ORAL at 20:44

## 2023-01-31 NOTE — CONSULT NOTE ADULT - SUBJECTIVE AND OBJECTIVE BOX
HPI:  Mr. Lovell is an 83 yo M with a pmh of HTN and HLD who presents today with a LLE DVT, he reports a three day history of swelling in his left leg, saw his PCP Dr. Sloano who did an ultrasound that was positive. He denies any complaints and reports that he thought that the swelling was secondary to antibiotic use after some dental work. He denies any chest pain, SOB, numbness, tingling. Patient has a Wells' Score of 1-pitting edema    PAST MEDICAL & SURGICAL HISTORY:  Hypertension    Hyperlipidemia    Gout    BPH (benign prostatic hyperplasia)    H/O bilateral cataract extraction  with IOL 15 yrs ago      REVIEW OF SYSTEMS  Head: denies headaches, dizziness & lightheadedness  Eyes: denies changes in vision, eye pain, double vision & eye discharge  Ears: denies changes in hearing & ear discharge  Nose: denies rhinorrhea  Mouth: denies bleeding gums & sore tongue & sore throat  Neck: denies swollen lymph nodes   Respiratory: denies SOB, cough, sputum production, wheezing  Cardiac: denies CP & irregular heart beat  Abdominal: denies abdominal pain, + in bowel movements  : denies dysuria, frequent urination, hematuria  Musculoskeletal: as noted in HPI  Neuro: denies involuntary muscle movements  Psych: no depression, no anxiety      Allergies  No Known Allergies          SOCIAL HISTORY:    FAMILY HISTORY:  FH: stroke (Mother)    FH: colon cancer (Father)        Vital Signs Last 24 Hrs  T(C): 36.3 (31 Jan 2023 15:35), Max: 36.3 (31 Jan 2023 15:35)  T(F): 97.3 (31 Jan 2023 15:35), Max: 97.3 (31 Jan 2023 15:35)  HR: 66 (31 Jan 2023 15:35) (66 - 66)  BP: 147/86 (31 Jan 2023 15:35) (147/86 - 147/86)  BP(mean): --  RR: 18 (31 Jan 2023 15:35) (18 - 18)  SpO2: 98% (31 Jan 2023 15:35) (98% - 98%)    Parameters below as of 31 Jan 2023 15:35  Patient On (Oxygen Delivery Method): room air        PHYSICAL EXAM:  Constitutional: AAOx3, no acute distress  HEENT: NCAT, airway patent  Cardiovascular: RRR, pulses present bilaterally  Respiratory: nonlabored breathing  Gastrointestinal: abdomen soft, nontender, non distended, no rebound or guarding  Neuro: no focal deficits  Ext: LLE pitting edema     LABS:                        14.6   7.08  )-----------( 172      ( 31 Jan 2023 18:10 )             44.4     01-31    142  |  113<H>  |  23  ----------------------------<  100<H>  4.4   |  26  |  0.98    Ca    9.2      31 Jan 2023 18:10    TPro  6.9  /  Alb  3.4  /  TBili  0.2  /  DBili  x   /  AST  21  /  ALT  21  /  AlkPhos  58  01-31    PT/INR - ( 31 Jan 2023 18:10 )   PT: 13.7 sec;   INR: 1.17 ratio         PTT - ( 31 Jan 2023 18:10 )  PTT:29.0 sec      RADIOLOGY & ADDITIONAL STUDIES:  ACC: [id number] EXAM: US DPLX LWR EXT VEINS LTD LT ORDERED BY: SHREYAS TRIPATHI    PROCEDURE DATE: 01/31/2023        INTERPRETATION: CLINICAL INFORMATION: Left leg swelling for 3 days. Left leg deep vein thrombosis seen at outside facility.    COMPARISON: None available.    TECHNIQUE: Duplex sonography of the LEFT LOWER extremity veins with color and spectral Doppler, with and without compression.    FINDINGS:    There is normal compressibility of the left common femoral vein.    Noncompressible occlusive thrombi are noted in the left femoral, profunda femoral, popliteal, posterior tibial, peroneal, and gastrocnemius veins, compatible with deep vein thrombosis.    The contralateral common femoral vein is patent.    3.9 x 1.4 x 1.5 cm left Baker's cyst.    IMPRESSION:  Extensive deep vein thrombosis in the left leg as described above.    Left Weaver's cyst.    Dr. Tripathi is informed with read back.    --- End of Report ---            CARINE MIGUEL MD; Attending Radiologist  This document has been electronically signed. Jan 31 2023 6:14PM

## 2023-01-31 NOTE — ED PROVIDER NOTE - OBJECTIVE STATEMENT
82-year-old male with past medical history of hypertension and hyperlipidemia presents today due to left lower extremity DVT.  Patient reports he had swelling to his left leg for 3 days in which his PCP Kilo ordered him an ultrasound which came back positive.  Patient reports feeling well overall and felt the left leg swelling was due to recent antibiotics after dental work.  Patient denies chest pain, shortness of breath, numbness, weakness, trauma, limited range of motion, or any other complaints.

## 2023-01-31 NOTE — ED PROVIDER NOTE - PROVIDER TOKENS
PROVIDER:[TOKEN:[[id number]],FOLLOWUP:[1-3 Days]],PROVIDER:[TOKEN:[[id number]],FOLLOWUP:[1-3 Days]]

## 2023-01-31 NOTE — ED PROVIDER NOTE - CLINICAL SUMMARY MEDICAL DECISION MAKING FREE TEXT BOX
here with extensive lower extremity dvt. extremity neuro-vascularly intact. check labs, vascular consult.

## 2023-01-31 NOTE — CONSULT NOTE ADULT - ASSESSMENT
83 yo M w pmh of HTN and HLD presenting with a unprovoked LLE DVT     Plan:  Discharge on DOAC (Eliquis 10mg BID x1 week then 5mg BID)  Follow up with Dr. Douglass outpatient next week  Follow up with heme onc   Discussed with Dr. Douglass

## 2023-01-31 NOTE — ED PROVIDER NOTE - ATTENDING APP SHARED VISIT CONTRIBUTION OF CARE
This was a shared visit with WILEY. I reviewed and verified the documentation and independently performed the documented MDM.

## 2023-01-31 NOTE — ED PROVIDER NOTE - NSFOLLOWUPINSTRUCTIONS_ED_ALL_ED_FT
Follow-up with vascular surgery and hematology.  He will need a work-up assessing risk factors for DVT.  Eliquis anticoagulation was sent to your pharmacy, you were given a dose in the ER.  You can take your next dose in 12 hours.  Return for chest pain, shortness of breath, numbness/weakness, discoloration of the foot, increased swelling.      Deep Vein Thrombosis    A person's legs with close-ups showing a normal vein, a vein with a blood clot, and a blood clot that breaks loose.   Deep vein thrombosis (DVT) is a condition in which a blood clot forms in a vein of the deep venous system. This can occur in the lower leg, thigh, pelvis, arm, or neck. A clot is blood that has thickened into a gel or solid. This condition is serious and can be life-threatening if the clot travels to the arteries of the lungs and causes a blockage (pulmonary embolism). A DVT can also damage veins in the leg, which can lead to long-term venous disease, leg pain, swelling, discoloration, and ulcers or sores (post-thrombotic syndrome).      What are the causes?    This condition may be caused by:  •A slowdown of blood flow.      •Damage to a vein.      •A condition that causes blood to clot more easily, such as certain bleeding disorders.        What increases the risk?    The following factors may make you more likely to develop this condition:  •Obesity.      •Being older, especially older than age 60.    •Being inactive or not moving around (sedentary lifestyle). This may include:  •Sitting or lying down for longer than 4–6 hours other than to sleep at night.      •Being in the hospital, or having major or lengthy surgery.      •Having any recent bone injuries, such as breaks (fractures), that reduce movement, especially in the lower extremities.      •Having recent orthopedic surgery on the lower extremities.        •Being pregnant, giving birth, or having recently given birth.      •Taking medicines that contain estrogen, such as birth control or hormone replacement therapy.      •Using products that contain nicotine or tobacco, especially if you use hormonal birth control.      •Having a history of a blood vessel disease (peripheral vascular disease) or congestive heart disease.      •Having a history of cancer, especially if being treated with chemotherapy.        What are the signs or symptoms?    Symptoms of this condition include:  •Swelling, pain, pressure, or tenderness in an arm or a leg.      •An arm or a leg becoming warm, red, or discolored.      •A leg turning very pale or blue. You may have a large DVT. This is rare.      If the clot is in your leg, you may notice that symptoms get worse when you stand or walk.    In some cases, there are no symptoms.      How is this diagnosed?    This condition is diagnosed with:  •Your medical history and a physical exam.    •Tests, such as:  •Blood tests to check how well your blood clots.      •Doppler ultrasound. This is the best way to find a DVT.      •CT venogram. Contrast dye is injected into a vein, and X-rays are taken to check for clots. This is helpful for veins in the chest or pelvis.          How is this treated?    Treatment for this condition depends on:  •The cause of your DVT.      •The size and location of your DVT, or having more than one DVT.      •Your risk for bleeding or developing more clots.      •Other medical conditions you may have.      Treatment may include:  •Taking a blood thinner medicine (anticoagulant) to prevent more clots from forming or current clots from growing.      •Wearing compression stockings.      •Injecting medicines into the affected vein to break up the clot (catheter-directed thrombolysis).    •Surgical procedures, when DVT is severe or hard to treat. These may be done to:  •Isolate and remove your clot.      •Place an inferior vena cava (IVC) filter. This filter is placed into a large vein called the inferior vena cava to catch blood clots before they reach your lungs.        You may get some medical treatments for 6 months or longer.      Follow these instructions at home:    If you are taking blood thinners:     •Talk with your health care provider before you take any medicines that contain aspirin or NSAIDs, such as ibuprofen. These medicines increase your risk for dangerous bleeding.      •Take your medicine exactly as told, at the same time every day. Do not skip a dose. Do not take more than the prescribed dose. This is important.      •Ask your health care provider about foods and medicines that could change or interact with the way your blood thinner works. Avoid these foods and medicines if you are told to do so.    •Avoid anything that may cause bleeding or bruising. You may bleed more easily while taking blood thinners.  •Be very careful when using knives, scissors, or other sharp objects.      •Use an electric razor instead of a blade.      •Avoid activities that could cause injury or bruising, and follow instructions for preventing falls.      •Tell your health care provider if you have had any internal bleeding, bleeding ulcers, or neurologic diseases, such as strokes or cerebral aneurysms.        •Wear a medical alert bracelet or carry a card that lists what medicines you take.      General instructions     •Take over-the-counter and prescription medicines only as told by your health care provider.      •Return to your normal activities as told by your health care provider. Ask your health care provider what activities are safe for you.      •If recommended, wear compression stockings as told by your health care provider. These stockings help to prevent blood clots and reduce swelling in your legs. Never wear your compression stockings while sleeping at night.      •Keep all follow-up visits. This is important.        Where to find more information    •American Heart Association: www.heart.org      •Centers for Disease Control and Prevention: www.cdc.gov      •National Heart, Lung, and Blood [place]: www.nhlbi.nih.gov        Contact a health care provider if:    •You miss a dose of your blood thinner.      •You have unusual bruising or other color changes.      •You have new or worse pain, swelling, or redness in an arm or a leg.      •You have worsening numbness or tingling in an arm or a leg.      •You have a significant color change (pale or blue) in the extremity that has the DVT.        Get help right away if:  •You have signs or symptoms that a blood clot has moved to the lungs. These may include:  •Shortness of breath.      •Chest pain.      •Fast or irregular heartbeats (palpitations).      •Light-headedness, dizziness, or fainting.      •Coughing up blood.      •You have signs or symptoms that your blood is too thin. These may include:  •Blood in your vomit, stool, or urine.      •A cut that will not stop bleeding.      •A menstrual period that is heavier than usual.      •A severe headache or confusion.        These symptoms may be an emergency. Get help right away. Call 911.   • Do not wait to see if the symptoms will go away.        •  Do not drive yourself to the hospital.         Summary    •Deep vein thrombosis (DVT) happens when a blood clot forms in a deep vein. This may occur in the lower leg, thigh, pelvis, arm, or neck.      •Symptoms affect the arm or leg and can include swelling, pain, tenderness, warmth, redness, or discoloration.      •This condition may be treated with medicines. In severe cases, a procedure or surgery may be done to remove or dissolve the clots.      •If you are taking blood thinners, take them exactly as told. Do not skip a dose. Do not take more than is prescribed.      •Get help right away if you have a severe headache, shortness of breath, chest pain, fast or irregular heartbeats, or blood in your vomit, urine, or stool.      This information is not intended to replace advice given to you by your health care provider. Make sure you discuss any questions you have with your health care provider.

## 2023-01-31 NOTE — ED ADULT TRIAGE NOTE - CHIEF COMPLAINT QUOTE
Patient is a 83yo male complaining of DVT in left leg Patient denies pain in left leg Patient left leg is swollen Patient was sent by MD Boateng as a positive DVT

## 2023-01-31 NOTE — CONSULT NOTE ADULT - CONSULT REQUESTED BY NAME
ED Detail Level: Simple Otc Regimen: CeraVe healing ointment to hands and recommend super glueing cracks in fingers

## 2023-01-31 NOTE — ED PROVIDER NOTE - PHYSICAL EXAMINATION
Constitutional: Awake, Alert, non-toxic. NAD. Well appearing, well nourished.   HEAD: Normocephalic, atraumatic.   EYES: EOM intact, conjunctiva and sclera are clear bilaterally.   ENT: No rhinorrhea, patent, mucous membranes pink/moist, no drooling or stridor.   NECK: Supple, non-tender  CARDIOVASCULAR: Normal S1, S2; regular rate and rhythm.  RESPIRATORY: Normal respiratory effort; breath sounds CTAB, no wheezes, rhonchi, or rales. Speaking in full sentences. No accessory muscle use.   ABDOMEN: Soft; non-tender, non-distended.   EXTREMITIES: Full passive and active ROM in all extremities; (+) LLE swelling, negative Ubaldo sign, ankle non-tender to palpation; distal pulses palpable and symmetric  SKIN: Warm, dry; good skin turgor, no apparent lesions or rashes, no ecchymosis, brisk capillary refill.  NEURO: A&O x3. Sensory and motor functions are grossly intact. Speech is normal. Appearance and judgement seem appropriate for gender and age.

## 2023-01-31 NOTE — ED PROVIDER NOTE - PROGRESS NOTE DETAILS
Vascular consulted. Advised dc with acute outpatient follow up and Eliquis rx.  All questions were answered. Discussed the importance of prompt, close medical follow-up. Patient will return with any changes, concerns or persistent/worsening symptoms.  Patient verbalized understanding.

## 2023-01-31 NOTE — ED PROVIDER NOTE - PATIENT PORTAL LINK FT
You can access the FollowMyHealth Patient Portal offered by [facility] by registering at the following website: [email or web address]. By joining [employer]’s FollowMyHealth portal, you will also be able to view your health information using other applications (apps) compatible with our system.

## 2023-01-31 NOTE — ED PROVIDER NOTE - CARE PROVIDER_API CALL
Ann-Marie Birmingham)  Vascular Surgery  [address] [place] [address]  Phone: [phone]  Fax: [phone]  Follow Up Time: 1-3 Days    George Mukherjee)  Hematology; Internal Medicine; Medical Oncology  [address], [address]  [place] [address]  Phone: [phone]  Fax: [phone]  Follow Up Time: 1-3 Days

## 2023-02-01 PROCEDURE — 99222 1ST HOSP IP/OBS MODERATE 55: CPT

## 2023-02-07 PROBLEM — Z00.00 ENCOUNTER FOR PREVENTIVE HEALTH EXAMINATION: Status: ACTIVE | Noted: 2023-02-07

## 2023-02-08 ENCOUNTER — APPOINTMENT (OUTPATIENT)
Dept: VASCULAR SURGERY | Facility: CLINIC | Age: 83
End: 2023-02-08
Payer: MEDICARE

## 2023-02-08 VITALS
BODY MASS INDEX: 21.94 KG/M2 | DIASTOLIC BLOOD PRESSURE: 71 MMHG | RESPIRATION RATE: 16 BRPM | TEMPERATURE: 97.4 F | HEART RATE: 58 BPM | SYSTOLIC BLOOD PRESSURE: 149 MMHG | OXYGEN SATURATION: 95 % | HEIGHT: 70.5 IN | WEIGHT: 155 LBS

## 2023-02-08 DIAGNOSIS — I82.412 ACUTE EMBOLISM AND THROMBOSIS OF LEFT FEMORAL VEIN: ICD-10-CM

## 2023-02-08 PROCEDURE — 93971 EXTREMITY STUDY: CPT

## 2023-02-08 PROCEDURE — 99213 OFFICE O/P EST LOW 20 MIN: CPT

## 2023-02-08 RX ORDER — ALLOPURINOL 300 MG/1
300 TABLET ORAL
Refills: 0 | Status: ACTIVE | COMMUNITY

## 2023-02-08 RX ORDER — FINASTERIDE 5 MG/1
5 TABLET, FILM COATED ORAL
Refills: 0 | Status: ACTIVE | COMMUNITY

## 2023-02-08 RX ORDER — ATORVASTATIN CALCIUM 10 MG/1
10 TABLET, FILM COATED ORAL
Refills: 0 | Status: ACTIVE | COMMUNITY

## 2023-02-08 RX ORDER — AMLODIPINE BESYLATE 5 MG/1
5 TABLET ORAL
Refills: 0 | Status: ACTIVE | COMMUNITY

## 2023-02-08 RX ORDER — TAMSULOSIN HYDROCHLORIDE 0.4 MG/1
0.4 CAPSULE ORAL
Refills: 0 | Status: ACTIVE | COMMUNITY

## 2023-02-08 NOTE — ASSESSMENT
[FreeTextEntry1] : DVT now chronic. Symptoms improving. Concerning weight loss in the setting of hypercoagulable state. No predisposing factor but positive fam hx.

## 2023-02-08 NOTE — HISTORY OF PRESENT ILLNESS
[FreeTextEntry1] : 83 yo M with extensive LLE DVT. Unprovoked. Diagnosed on Jan 31st. Started on Eliquis since. Symptoms of calf swelling improved significantly. Does have a sister with hx of blood clots. States he is up to date on screenings but has been loosing weight. (15 lbs in few months) and has been urinating more. He has a cath due to prostate enlargement.

## 2023-03-14 ENCOUNTER — OUTPATIENT (OUTPATIENT)
Dept: OUTPATIENT SERVICES | Facility: HOSPITAL | Age: 83
LOS: 1 days | End: 2023-03-14
Payer: MEDICARE

## 2023-03-14 ENCOUNTER — APPOINTMENT (OUTPATIENT)
Dept: CT IMAGING | Facility: CLINIC | Age: 83
End: 2023-03-14
Payer: MEDICARE

## 2023-03-14 DIAGNOSIS — D68.59 OTHER PRIMARY THROMBOPHILIA: ICD-10-CM

## 2023-03-14 DIAGNOSIS — Z98.41 CATARACT EXTRACTION STATUS, RIGHT EYE: Chronic | ICD-10-CM

## 2023-03-14 PROCEDURE — 71250 CT THORAX DX C-: CPT | Mod: MH

## 2023-03-14 PROCEDURE — 71250 CT THORAX DX C-: CPT | Mod: 26,MH

## 2023-03-14 PROCEDURE — 74176 CT ABD & PELVIS W/O CONTRAST: CPT | Mod: 26,MH

## 2023-03-14 PROCEDURE — 74176 CT ABD & PELVIS W/O CONTRAST: CPT | Mod: MH

## 2023-04-22 ENCOUNTER — EMERGENCY (EMERGENCY)
Facility: HOSPITAL | Age: 83
LOS: 1 days | Discharge: HOME | End: 2023-04-22
Attending: EMERGENCY MEDICINE | Admitting: EMERGENCY MEDICINE
Payer: MEDICARE

## 2023-04-22 VITALS
OXYGEN SATURATION: 96 % | DIASTOLIC BLOOD PRESSURE: 91 MMHG | HEIGHT: 70 IN | HEART RATE: 77 BPM | SYSTOLIC BLOOD PRESSURE: 200 MMHG | RESPIRATION RATE: 16 BRPM | TEMPERATURE: 97 F | WEIGHT: 167.99 LBS

## 2023-04-22 VITALS
HEART RATE: 70 BPM | DIASTOLIC BLOOD PRESSURE: 78 MMHG | SYSTOLIC BLOOD PRESSURE: 161 MMHG | TEMPERATURE: 98 F | OXYGEN SATURATION: 97 % | RESPIRATION RATE: 18 BRPM

## 2023-04-22 DIAGNOSIS — Z98.41 CATARACT EXTRACTION STATUS, RIGHT EYE: Chronic | ICD-10-CM

## 2023-04-22 LAB
APPEARANCE UR: ABNORMAL
BILIRUB UR-MCNC: NEGATIVE — SIGNIFICANT CHANGE UP
COLOR SPEC: YELLOW — SIGNIFICANT CHANGE UP
DIFF PNL FLD: ABNORMAL
GLUCOSE UR QL: NEGATIVE — SIGNIFICANT CHANGE UP
KETONES UR-MCNC: NEGATIVE — SIGNIFICANT CHANGE UP
LEUKOCYTE ESTERASE UR-ACNC: ABNORMAL
NITRITE UR-MCNC: POSITIVE
PH UR: 5 — SIGNIFICANT CHANGE UP (ref 5–8)
PROT UR-MCNC: 30 MG/DL
SP GR SPEC: 1.02 — SIGNIFICANT CHANGE UP (ref 1.01–1.02)
UROBILINOGEN FLD QL: NEGATIVE — SIGNIFICANT CHANGE UP

## 2023-04-22 PROCEDURE — 87186 SC STD MICRODIL/AGAR DIL: CPT

## 2023-04-22 PROCEDURE — 99283 EMERGENCY DEPT VISIT LOW MDM: CPT | Mod: CS

## 2023-04-22 PROCEDURE — 87086 URINE CULTURE/COLONY COUNT: CPT

## 2023-04-22 PROCEDURE — 81001 URINALYSIS AUTO W/SCOPE: CPT

## 2023-04-22 PROCEDURE — 51702 INSERT TEMP BLADDER CATH: CPT

## 2023-04-22 PROCEDURE — 99284 EMERGENCY DEPT VISIT MOD MDM: CPT | Mod: FS

## 2023-04-22 PROCEDURE — 87077 CULTURE AEROBIC IDENTIFY: CPT

## 2023-04-22 RX ORDER — CEFUROXIME AXETIL 250 MG
500 TABLET ORAL ONCE
Refills: 0 | Status: COMPLETED | OUTPATIENT
Start: 2023-04-22 | End: 2023-04-22

## 2023-04-22 RX ADMIN — Medication 500 MILLIGRAM(S): at 21:08

## 2023-04-22 NOTE — ED PROVIDER NOTE - OBJECTIVE STATEMENT
82-year-old male with past medical history of hypertension, hyperlipidemia, and BPH presents today complaining of a Alberto catheter complication.  Patient reports that the catheter has not been draining as much as it normally would since Friday.  Patient reports noticing some mild blood in the urine.  Patient notes some suprapubic fullness.  Patient is currently on Eliquis due to recently diagnosed blood clot.  Patient denies fever, vomiting, flank pain, gross blood in urine, antibiotic use, or any other complaints.

## 2023-04-22 NOTE — ED PROVIDER NOTE - CARE PROVIDER_API CALL
Gurpreet Casanova (MD)  Urology  [address]  [place] [address]  Phone: [phone]  Fax: [phone]  Follow Up Time:

## 2023-04-22 NOTE — ED PROVIDER NOTE - PROGRESS NOTE DETAILS
urine draining from hopkins. ceftin rxed. All questions were answered. Discussed the importance of prompt, close medical follow-up. Patient will return with any changes, concerns or persistent/worsening symptoms.  Patient verbalized understanding.

## 2023-04-22 NOTE — ED ADULT NURSE NOTE - OBJECTIVE STATEMENT
Patient received complaining of urinary catheter problem. States was draining fine yesterday and drained several times at night, today it was empty and felt a pressure on lower abdomen. Patient is AOx4, ambulatory, safety precautions in place, hopkins replaced with 16fr in ER by RN, draining with few noticeably clots and draining clear yellow. Awaiting evaluation.

## 2023-04-22 NOTE — ED PROVIDER NOTE - ATTENDING APP SHARED VISIT CONTRIBUTION OF CARE
82-year-old male with past medical history of BPH, gout, hyperlipidemia, hypertension, left lower extremity DVT on Eliquis, presents to the emergency department complaining of leaking Alberto, suprapubic discomfort, patient had new Alberto catheter placed by nurse in ER, it was readjusted and positive urine flow into the urinary bag and patient feels much improved.

## 2023-04-22 NOTE — ED PROVIDER NOTE - NSFOLLOWUPINSTRUCTIONS_ED_ALL_ED_FT
Follow up with your primary care doctor and urology. return for abdominal pain, fever, difficulty urinating, flank pain, bloody urine, worsening condition.       Indwelling Urinary Catheter Care, Adult      An indwelling urinary catheter is a thin, flexible tube that is placed into the bladder to help drain urine out of the body. The catheter is inserted into the urethra. The urethra is the part of the body that drains urine from the bladder. Urine drains from the catheter into a drainage bag outside of the body.    Taking good care of your catheter will keep it working properly and help to prevent problems from developing.    What are the risks?  Bacteria may get into your bladder and cause a urinary tract infection.  Urine flow can become blocked. This can happen if the catheter is not working correctly, or if you have sediment or a blood clot in your bladder or catheter.  Tissue near the catheter may become irritated and may bleed.  How to wear your catheter and your drainage bag  Supplies needed    Adhesive tape or a leg strap.  Alcohol wipe or soap and water (if you use tape).  A clean towel (if you use tape).  Overnight drainage bag.  Smaller drainage bag (leg bag).  Wearing your catheter and bag    Use adhesive tape or a leg strap to attach your catheter to your leg.  Make sure the catheter is not pulled tight.  If a leg strap gets wet, replace it with a dry one.  If you use adhesive tape:  Use an alcohol wipe or soap and water to wash off any stickiness on your skin where you had tape before.  Use a clean towel to pat-dry the area.  Apply the new tape.  You should have received a large overnight drainage bag and a smaller leg bag that fits underneath clothing.  You may wear the overnight bag at any time, but you should not wear the leg bag at night.  Make sure the overnight drainage bag is always lower than the level of your bladder, but do not let it touch the floor. Before you go to sleep, hang the bag inside a wastebasket that is covered by a clean plastic bag.  Secure the leg bag according to 's instructions. This may be above or below the knee, depending on the length of the tubing. Make sure that:  The leg bag is below the bladder.  The tubing does not have loops or too much tension.  How to care for the skin around the catheter  Supplies needed    A clean washcloth.  Water and mild soap.  A clean towel.  Caring for your skin and catheter    Female anatomy showing the labia, urethra, and an indwelling urinary catheter in the bladder.  Male anatomy showing the penis, urethra, and an indwelling urinary catheter in the bladder.  Every day, use a clean washcloth and soapy water to clean the skin around your catheter.  Wash your hands with soap and water.  Wet a washcloth in warm water and mild soap.  Clean the skin around your urethra.  If you are female:  Use one hand to gently spread the folds of skin around your vagina (labia).  With the washcloth in your other hand, wipe the inner side of your labia on each side. Do this in a front-to-back direction.  If you are male:  Use one hand to pull back any skin that covers the end of your penis (foreskin).  With the washcloth in your other hand, wipe your penis in small circles. Start wiping at the tip of your penis, then move outward from the catheter.  Move the foreskin back in place, if needed.  With your free hand, hold the catheter close to where it enters your body. Keep holding the catheter during cleaning so it does not get pulled out.  Use your other hand to clean the catheter with the washcloth.  Only wipe downward on the catheter, toward the bag.  Do not wipe upward toward your body, because that may push bacteria into your urethra and cause infection.  Use a clean towel to pat-dry the catheter and the skin around it. Make sure to wipe off all soap.  Wash your hands with soap and water.  Shower every day. Do not take baths.  Do not use cream, ointment, or lotion on the area where the catheter enters your body, unless your health care provider tells you to do that.  Do not use powders, sprays, or lotions on your genital area.  Check your skin around the catheter every day for signs of infection. Check for:  Redness, swelling, or pain.  Fluid or blood.  Warmth.  Pus or a bad smell.  How to empty the drainage bag  Supplies needed    Rubbing alcohol.  Gauze pad or cotton ball.  Adhesive tape or a leg strap.  Emptying the bag    Empty your drainage bag (your overnight drainage bag or your leg bag) when it is ?–½ full, or at least 2–3 times a day. Clean the drainage bag according to the 's instructions or as told by your health care provider.  Wash your hands with soap and water.  Detach the drainage bag from your leg.  Hold the drainage bag over the toilet or a clean container. Make sure the drainage bag is lower than your hips and bladder. This stops urine from going back into the tubing and into your bladder.  Open the pour spout at the bottom of the bag.  Empty the urine into the toilet or container. Do not let the pour spout touch any surface. This precaution is important to prevent bacteria from getting in the bag and causing infection.  Apply rubbing alcohol to a gauze pad or cotton ball.  Use the gauze pad or cotton ball to clean the pour spout.  Close the pour spout.  Attach the bag to your leg with adhesive tape or a leg strap.  Wash your hands with soap and water.  How to change the drainage bag  Supplies needed:    Alcohol wipes.  A clean drainage bag.  Adhesive tape or a leg strap.  Changing the bag    Replace your drainage bag with a clean bag if it leaks, starts to smell bad, or looks dirty.  Wash your hands with soap and water.  Detach the dirty drainage bag from your leg.  Pinch the catheter with your fingers so that urine does not spill out.  Disconnect the catheter tube from the drainage tube at the connection valve. Do not let the tubes touch any surface.  Clean the end of the catheter tube with an alcohol wipe. Use a different alcohol wipe to clean the end of the drainage tube.  Connect the catheter tube to the drainage tube of the clean bag.  Attach the clean bag to your leg with adhesive tape or a leg strap. Avoid attaching the new bag too tightly.  Wash your hands with soap and water.  General instructions  A person washing hands with soap and water.  Never pull on your catheter or try to remove it. Pulling can damage your internal tissues.  Always wash your hands before and after you handle your catheter or drainage bag. Use a mild, fragrance-free soap. If soap and water are not available, use hand .  Always make sure there are no twists, bends, or kinks in the catheter tube.  Always make sure there are no leaks in the catheter or drainage bag.  Drink enough fluid to keep your urine pale yellow.  Do not take baths, swim, or use a hot tub.  If you are female, wipe from front to back after having a bowel movement.  Contact a health care provider if:  Your catheter gets clogged.  Your catheter starts to leak.  You have signs of infection at the catheter site, such as:  Redness, swelling, or pain where the catheter enters your body.  Fluid, blood, pus, or a bad smell coming from the area where the catheter enters your body.  The area where the catheter enters your body feels warm to the touch.  You have signs of a urinary tract infection, such as:  Fever or chills.  Urine smells unusually bad.  Cloudy urine.  Pain in your abdomen, legs, lower back, or bladder.  Nausea or vomiting.  Get help right away if:  You see blood in the catheter.  Your urine is pink or red.  Your bladder feels full.  Your urine is not draining into the bag.  Your catheter gets pulled out.  Summary  An indwelling urinary catheter is a thin, flexible tube that is placed into the bladder to help drain urine out of the body.  The catheter is inserted into the part of the body that drains urine from the bladder (urethra).  Take good care of your catheter to keep it working properly and help prevent problems from developing.  Always wash your hands before and after you handle your catheter or drainage bag.  Never pull on your catheter or try to remove it.  This information is not intended to replace advice given to you by your health care provider. Make sure you discuss any questions you have with your health care provider.

## 2023-04-22 NOTE — ED ADULT TRIAGE NOTE - CHIEF COMPLAINT QUOTE
Pt states that he has been having difficulty urinating since Friday. Pt states that he has a urinary catheter and he noticed leaking around the catheter.

## 2023-04-22 NOTE — ED PROVIDER NOTE - PHYSICAL EXAMINATION

## 2023-04-22 NOTE — ED ADULT NURSE NOTE - NURSING GU BLADDER
APS notified on behalf of [facility] Weeks per Dr. Diandra Lindo and Daughter April . Spoke to Brady and the report number is [id number]. non-distended

## 2023-04-22 NOTE — ED PROVIDER NOTE - PATIENT PORTAL LINK FT
You can access the FollowMyHealth Patient Portal offered by [facility] by registering at the following website: [email or web address]. By joining [employer]’s FollowMyHealth portal, you will also be able to view your health information using other applications (apps) compatible with our system.

## 2023-04-22 NOTE — ED ADULT NURSE NOTE - DISCHARGE DATE/TIME
22-Apr-2023 21:18

## 2023-04-23 ENCOUNTER — EMERGENCY (EMERGENCY)
Facility: HOSPITAL | Age: 83
LOS: 1 days | Discharge: HOME | End: 2023-04-23
Attending: STUDENT IN AN ORGANIZED HEALTH CARE EDUCATION/TRAINING PROGRAM | Admitting: STUDENT IN AN ORGANIZED HEALTH CARE EDUCATION/TRAINING PROGRAM
Payer: MEDICARE

## 2023-04-23 VITALS
DIASTOLIC BLOOD PRESSURE: 72 MMHG | RESPIRATION RATE: 18 BRPM | TEMPERATURE: 97 F | HEIGHT: 70 IN | WEIGHT: 167.99 LBS | SYSTOLIC BLOOD PRESSURE: 127 MMHG | OXYGEN SATURATION: 96 % | HEART RATE: 82 BPM

## 2023-04-23 VITALS
OXYGEN SATURATION: 99 % | HEART RATE: 76 BPM | RESPIRATION RATE: 18 BRPM | TEMPERATURE: 98 F | DIASTOLIC BLOOD PRESSURE: 62 MMHG | SYSTOLIC BLOOD PRESSURE: 122 MMHG

## 2023-04-23 DIAGNOSIS — Z98.41 CATARACT EXTRACTION STATUS, RIGHT EYE: Chronic | ICD-10-CM

## 2023-04-23 LAB
ALBUMIN SERPL ELPH-MCNC: 3.8 G/DL — SIGNIFICANT CHANGE UP (ref 3.3–5)
ALP SERPL-CCNC: 53 U/L — SIGNIFICANT CHANGE UP (ref 40–120)
ALT FLD-CCNC: 24 U/L — SIGNIFICANT CHANGE UP (ref 12–78)
ANION GAP SERPL CALC-SCNC: 5 MMOL/L — SIGNIFICANT CHANGE UP (ref 5–17)
APTT BLD: 30.2 SEC — SIGNIFICANT CHANGE UP (ref 27.5–35.5)
AST SERPL-CCNC: 19 U/L — SIGNIFICANT CHANGE UP (ref 15–37)
BASOPHILS # BLD AUTO: 0.05 K/UL — SIGNIFICANT CHANGE UP (ref 0–0.2)
BASOPHILS # BLD AUTO: 0.06 K/UL — SIGNIFICANT CHANGE UP (ref 0–0.2)
BASOPHILS NFR BLD AUTO: 0.8 % — SIGNIFICANT CHANGE UP (ref 0–2)
BASOPHILS NFR BLD AUTO: 0.8 % — SIGNIFICANT CHANGE UP (ref 0–2)
BILIRUB SERPL-MCNC: 0.4 MG/DL — SIGNIFICANT CHANGE UP (ref 0.2–1.2)
BLD GP AB SCN SERPL QL: SIGNIFICANT CHANGE UP
BUN SERPL-MCNC: 28 MG/DL — HIGH (ref 7–23)
CALCIUM SERPL-MCNC: 9.2 MG/DL — SIGNIFICANT CHANGE UP (ref 8.5–10.1)
CHLORIDE SERPL-SCNC: 111 MMOL/L — HIGH (ref 96–108)
CO2 SERPL-SCNC: 25 MMOL/L — SIGNIFICANT CHANGE UP (ref 22–31)
CREAT SERPL-MCNC: 1.2 MG/DL — SIGNIFICANT CHANGE UP (ref 0.5–1.3)
EGFR: 61 ML/MIN/1.73M2 — SIGNIFICANT CHANGE UP
EOSINOPHIL # BLD AUTO: 0.19 K/UL — SIGNIFICANT CHANGE UP (ref 0–0.5)
EOSINOPHIL # BLD AUTO: 0.2 K/UL — SIGNIFICANT CHANGE UP (ref 0–0.5)
EOSINOPHIL NFR BLD AUTO: 2.7 % — SIGNIFICANT CHANGE UP (ref 0–6)
EOSINOPHIL NFR BLD AUTO: 2.9 % — SIGNIFICANT CHANGE UP (ref 0–6)
GLUCOSE SERPL-MCNC: 109 MG/DL — HIGH (ref 70–99)
HCT VFR BLD CALC: 40.9 % — SIGNIFICANT CHANGE UP (ref 39–50)
HCT VFR BLD CALC: 45.3 % — SIGNIFICANT CHANGE UP (ref 39–50)
HGB BLD-MCNC: 13.7 G/DL — SIGNIFICANT CHANGE UP (ref 13–17)
HGB BLD-MCNC: 15.5 G/DL — SIGNIFICANT CHANGE UP (ref 13–17)
IMM GRANULOCYTES NFR BLD AUTO: 0.1 % — SIGNIFICANT CHANGE UP (ref 0–0.9)
IMM GRANULOCYTES NFR BLD AUTO: 0.2 % — SIGNIFICANT CHANGE UP (ref 0–0.9)
INR BLD: 1.31 RATIO — HIGH (ref 0.88–1.16)
LYMPHOCYTES # BLD AUTO: 1.01 K/UL — SIGNIFICANT CHANGE UP (ref 1–3.3)
LYMPHOCYTES # BLD AUTO: 1.12 K/UL — SIGNIFICANT CHANGE UP (ref 1–3.3)
LYMPHOCYTES # BLD AUTO: 13.6 % — SIGNIFICANT CHANGE UP (ref 13–44)
LYMPHOCYTES # BLD AUTO: 17.1 % — SIGNIFICANT CHANGE UP (ref 13–44)
MCHC RBC-ENTMCNC: 31.4 PG — SIGNIFICANT CHANGE UP (ref 27–34)
MCHC RBC-ENTMCNC: 32 PG — SIGNIFICANT CHANGE UP (ref 27–34)
MCHC RBC-ENTMCNC: 33.5 GM/DL — SIGNIFICANT CHANGE UP (ref 32–36)
MCHC RBC-ENTMCNC: 34.2 GM/DL — SIGNIFICANT CHANGE UP (ref 32–36)
MCV RBC AUTO: 93.6 FL — SIGNIFICANT CHANGE UP (ref 80–100)
MCV RBC AUTO: 93.8 FL — SIGNIFICANT CHANGE UP (ref 80–100)
MONOCYTES # BLD AUTO: 0.57 K/UL — SIGNIFICANT CHANGE UP (ref 0–0.9)
MONOCYTES # BLD AUTO: 0.65 K/UL — SIGNIFICANT CHANGE UP (ref 0–0.9)
MONOCYTES NFR BLD AUTO: 7.7 % — SIGNIFICANT CHANGE UP (ref 2–14)
MONOCYTES NFR BLD AUTO: 9.9 % — SIGNIFICANT CHANGE UP (ref 2–14)
NEUTROPHILS # BLD AUTO: 4.54 K/UL — SIGNIFICANT CHANGE UP (ref 1.8–7.4)
NEUTROPHILS # BLD AUTO: 5.56 K/UL — SIGNIFICANT CHANGE UP (ref 1.8–7.4)
NEUTROPHILS NFR BLD AUTO: 69.1 % — SIGNIFICANT CHANGE UP (ref 43–77)
NEUTROPHILS NFR BLD AUTO: 75.1 % — SIGNIFICANT CHANGE UP (ref 43–77)
NRBC # BLD: 0 /100 WBCS — SIGNIFICANT CHANGE UP (ref 0–0)
NRBC # BLD: 0 /100 WBCS — SIGNIFICANT CHANGE UP (ref 0–0)
PLATELET # BLD AUTO: 115 K/UL — LOW (ref 150–400)
PLATELET # BLD AUTO: 128 K/UL — LOW (ref 150–400)
POTASSIUM SERPL-MCNC: 3.8 MMOL/L — SIGNIFICANT CHANGE UP (ref 3.5–5.3)
POTASSIUM SERPL-SCNC: 3.8 MMOL/L — SIGNIFICANT CHANGE UP (ref 3.5–5.3)
PROT SERPL-MCNC: 7.4 G/DL — SIGNIFICANT CHANGE UP (ref 6–8.3)
PROTHROM AB SERPL-ACNC: 15.4 SEC — HIGH (ref 10.5–13.4)
RBC # BLD: 4.36 M/UL — SIGNIFICANT CHANGE UP (ref 4.2–5.8)
RBC # BLD: 4.84 M/UL — SIGNIFICANT CHANGE UP (ref 4.2–5.8)
RBC # FLD: 13.5 % — SIGNIFICANT CHANGE UP (ref 10.3–14.5)
RBC # FLD: 13.6 % — SIGNIFICANT CHANGE UP (ref 10.3–14.5)
SODIUM SERPL-SCNC: 141 MMOL/L — SIGNIFICANT CHANGE UP (ref 135–145)
WBC # BLD: 6.56 K/UL — SIGNIFICANT CHANGE UP (ref 3.8–10.5)
WBC # BLD: 7.41 K/UL — SIGNIFICANT CHANGE UP (ref 3.8–10.5)
WBC # FLD AUTO: 6.56 K/UL — SIGNIFICANT CHANGE UP (ref 3.8–10.5)
WBC # FLD AUTO: 7.41 K/UL — SIGNIFICANT CHANGE UP (ref 3.8–10.5)

## 2023-04-23 PROCEDURE — 85610 PROTHROMBIN TIME: CPT

## 2023-04-23 PROCEDURE — 85025 COMPLETE CBC W/AUTO DIFF WBC: CPT

## 2023-04-23 PROCEDURE — 85730 THROMBOPLASTIN TIME PARTIAL: CPT

## 2023-04-23 PROCEDURE — 86900 BLOOD TYPING SEROLOGIC ABO: CPT

## 2023-04-23 PROCEDURE — 99284 EMERGENCY DEPT VISIT MOD MDM: CPT | Mod: 25

## 2023-04-23 PROCEDURE — 86901 BLOOD TYPING SEROLOGIC RH(D): CPT

## 2023-04-23 PROCEDURE — 99284 EMERGENCY DEPT VISIT MOD MDM: CPT

## 2023-04-23 PROCEDURE — 74176 CT ABD & PELVIS W/O CONTRAST: CPT | Mod: MA

## 2023-04-23 PROCEDURE — 36415 COLL VENOUS BLD VENIPUNCTURE: CPT

## 2023-04-23 PROCEDURE — 74176 CT ABD & PELVIS W/O CONTRAST: CPT | Mod: 26,MA

## 2023-04-23 PROCEDURE — 80053 COMPREHEN METABOLIC PANEL: CPT

## 2023-04-23 PROCEDURE — 86850 RBC ANTIBODY SCREEN: CPT

## 2023-04-23 NOTE — ED PROVIDER NOTE - PATIENT PORTAL LINK FT
You can access the FollowMyHealth Patient Portal offered by [facility] by registering at the following website: [email or web address]. By joining [employer]’s FollowMyHealth portal, you will also be able to view your health information using other applications (apps) compatible with our system.

## 2023-04-23 NOTE — ED PROVIDER NOTE - PROGRESS NOTE DETAILS
spoke with dr ndiaye, states if urine is flowing nad no clots ok for dc and follow up with dr cornelius tomorrow

## 2023-04-23 NOTE — ED PROVIDER NOTE - PHYSICAL EXAMINATION
Gen: Well appearing in NAD  Head: NC/AT  Neck: trachea midline  cv: rrr  Resp:  No distress  abd nontender, no cvat   Ext: no deformities  Neuro:  A&O appears non focal  Skin:  Warm and dry as visualized  Psych:  Normal affect and mood

## 2023-04-23 NOTE — ED PROVIDER NOTE - CARE PROVIDERS DIRECT ADDRESSES
[email or web address]

## 2023-04-23 NOTE — ED PROVIDER NOTE - CLINICAL SUMMARY MEDICAL DECISION MAKING FREE TEXT BOX
81 y/o M with hx of BPH, hypertension, hyperlipidemia, gout presents with hematuria, was seen in ED yesterday for clogged catheter andw as replaced, had slight hematuria which cleared but now since this morning urine is bloody, did note some clot. no retention, no abd pain, no beth pain, no fevers. sees uro dr cornelius    pt is on eliquis 2/2 DVT   will check ct ro stone or bldder neoplasm, will dw dr cornelius

## 2023-04-23 NOTE — ED PROVIDER NOTE - CARE PROVIDER_API CALL
Gurpreet Casanova (MD)  Urology  [address], [address]  [place] [address]  Phone: [phone]  Fax: [phone]  Follow Up Time: Urgent

## 2023-04-23 NOTE — ED PROVIDER NOTE - OBJECTIVE STATEMENT
81 y/o M with hx of BPH, hypertension, hyperlipidemia, gout presents with hematuria, was seen in ED yesterday for clogged catheter andw as replaced, had slight hematuria which cleared but now since this morning urine is bloody, did note some clot. no retention, no abd pain, no beth pain, no fevers. sees uro dr cornelius    pt is on eliquis 2/2 DVT

## 2023-04-23 NOTE — ED ADULT TRIAGE NOTE - CHIEF COMPLAINT QUOTE
Patient seen here last night and was told to come back if he has blood in his hopkins bag. Patient states last four times he emptied the hopkins it's been bloody

## 2023-04-23 NOTE — ED PROVIDER NOTE - NSFOLLOWUPINSTRUCTIONS_ED_ALL_ED_FT
Follow up with Dr Casanova tomorrow  Take antibiotics as directed   Return to ED for worsening symptoms     English    Hematuria, Adult    Hematuria is blood in the urine. Blood may be visible in the urine, or it may be identified with a test. This condition can be caused by infections of the bladder, urethra, kidney, or prostate. Other possible causes include:  Kidney stones.  Cancer of the urinary tract.  Too much calcium in the urine.  Conditions that are passed from parent to child (inherited conditions).  Exercise that requires a lot of energy.  Infections can usually be treated with medicine, and a kidney stone usually will pass through your urine. If neither of these is the cause of your hematuria, more tests may be needed to identify the cause of your symptoms.    It is very important to tell your health care provider about any blood in your urine, even if it is painless or the blood stops without treatment. Blood in the urine, when it happens and then stops and then happens again, can be a symptom of a very serious condition, including cancer. There is no pain in the initial stages of many urinary cancers.    Follow these instructions at home:  Medicines    Take over-the-counter and prescription medicines only as told by your health care provider.  If you were prescribed an antibiotic medicine, take it as told by your health care provider. Do not stop taking the antibiotic even if you start to feel better.  Eating and drinking    Drink enough fluid to keep your urine pale yellow. It is recommended that you drink 3–4 quarts (2.8–3.8 L) a day. If you have been diagnosed with an infection, drinking cranberry juice in addition to large amounts of water is recommended.  Avoid caffeine, tea, and carbonated beverages. These tend to irritate the bladder.  Avoid alcohol because it may irritate the prostate (in males).  General instructions    If you have been diagnosed with a kidney stone, follow your health care provider's instructions about straining your urine to catch the stone.  Empty your bladder often. Avoid holding urine for long periods of time.  If you are female:  After a bowel movement, wipe from front to back and use each piece of toilet paper only once.  Empty your bladder before and after sex.  Pay attention to any changes in your symptoms. Tell your health care provider about any changes or any new symptoms.  It is up to you to get the results of any tests. Ask your health care provider, or the department that is doing the test, when your results will be ready.  Keep all follow-up visits. This is important.  Contact a health care provider if:  You develop back pain.  You have a fever or chills.  You have nausea or vomiting.  Your symptoms do not improve after 3 days.  Your symptoms get worse.  Get help right away if:  You develop severe vomiting and are unable to take medicine without vomiting.  You develop severe pain in your back or abdomen even though you are taking medicine.  You pass a large amount of blood in your urine.  You pass blood clots in your urine.  You feel very weak or like you might faint.  You faint.  Summary  Hematuria is blood in the urine. It has many possible causes.  It is very important that you tell your health care provider about any blood in your urine, even if it is painless or the blood stops without treatment.  Take over-the-counter and prescription medicines only as told by your health care provider.  Drink enough fluid to keep your urine pale yellow.  This information is not intended to replace advice given to you by your health care provider. Make sure you discuss any questions you have with your health care provider.    Document Revised: 08/18/2021 Document Reviewed: 08/18/2021  Elsevier Patient Education © 2023 [employer]

## 2023-04-23 NOTE — ED ADULT NURSE NOTE - OBJECTIVE STATEMENT
Patient received complaining of hematuria today in leg bag, was seen in ER yesterday for blocked hopkins, was replaced by ER RN and was flowing with no issues. Patient at this time, denies any acute pain or discomfort and was told by urologist to be seen for any blood in urine. Patient is AOx4, ambulatory, safety precautions in place, awaiting evaluation.

## 2023-04-25 LAB
-  AMIKACIN: SIGNIFICANT CHANGE UP
-  AMOXICILLIN/CLAVULANIC ACID: SIGNIFICANT CHANGE UP
-  AMPICILLIN/SULBACTAM: SIGNIFICANT CHANGE UP
-  AMPICILLIN: SIGNIFICANT CHANGE UP
-  AZTREONAM: SIGNIFICANT CHANGE UP
-  CEFAZOLIN: SIGNIFICANT CHANGE UP
-  CEFEPIME: SIGNIFICANT CHANGE UP
-  CEFOXITIN: SIGNIFICANT CHANGE UP
-  CEFTRIAXONE: SIGNIFICANT CHANGE UP
-  CIPROFLOXACIN: SIGNIFICANT CHANGE UP
-  ERTAPENEM: SIGNIFICANT CHANGE UP
-  GENTAMICIN: SIGNIFICANT CHANGE UP
-  IMIPENEM: SIGNIFICANT CHANGE UP
-  LEVOFLOXACIN: SIGNIFICANT CHANGE UP
-  MEROPENEM: SIGNIFICANT CHANGE UP
-  NITROFURANTOIN: SIGNIFICANT CHANGE UP
-  PIPERACILLIN/TAZOBACTAM: SIGNIFICANT CHANGE UP
-  TOBRAMYCIN: SIGNIFICANT CHANGE UP
-  TRIMETHOPRIM/SULFAMETHOXAZOLE: SIGNIFICANT CHANGE UP
CULTURE RESULTS: SIGNIFICANT CHANGE UP
METHOD TYPE: SIGNIFICANT CHANGE UP
ORGANISM # SPEC MICROSCOPIC CNT: SIGNIFICANT CHANGE UP
ORGANISM # SPEC MICROSCOPIC CNT: SIGNIFICANT CHANGE UP
SPECIMEN SOURCE: SIGNIFICANT CHANGE UP

## 2024-09-24 NOTE — ED ADULT TRIAGE NOTE - PRO INTERPRETER NEED 2
September 24, 2024     Patient: Pastora Noonan   YOB: 2007   Date of Visit: 9/24/2024       To Whom it May Concern:    Pastora Noonan was seen in my clinic on 9/24/2024 at 1:30 pm.     Please excuse Pastora for her absence from school on the date listed above to be able to make her appointment.    Sincerely,         Leobardo Salinas MD    Medical information is confidential and cannot be disclosed without the written consent of the patient or her representative.       English